# Patient Record
Sex: MALE | Race: WHITE | NOT HISPANIC OR LATINO | Employment: OTHER | URBAN - METROPOLITAN AREA
[De-identification: names, ages, dates, MRNs, and addresses within clinical notes are randomized per-mention and may not be internally consistent; named-entity substitution may affect disease eponyms.]

---

## 2019-04-03 ENCOUNTER — OFFICE VISIT (OUTPATIENT)
Dept: FAMILY MEDICINE | Facility: CLINIC | Age: 64
End: 2019-04-03
Payer: MEDICARE

## 2019-04-03 VITALS
SYSTOLIC BLOOD PRESSURE: 150 MMHG | BODY MASS INDEX: 29.18 KG/M2 | DIASTOLIC BLOOD PRESSURE: 75 MMHG | WEIGHT: 197 LBS | HEART RATE: 68 BPM | OXYGEN SATURATION: 97 % | HEIGHT: 69 IN | TEMPERATURE: 98 F

## 2019-04-03 DIAGNOSIS — E78.00 PURE HYPERCHOLESTEROLEMIA: ICD-10-CM

## 2019-04-03 DIAGNOSIS — F51.01 PRIMARY INSOMNIA: Primary | ICD-10-CM

## 2019-04-03 DIAGNOSIS — I10 ESSENTIAL HYPERTENSION: ICD-10-CM

## 2019-04-03 DIAGNOSIS — M15.9 PRIMARY OSTEOARTHRITIS INVOLVING MULTIPLE JOINTS: ICD-10-CM

## 2019-04-03 PROCEDURE — 99214 OFFICE O/P EST MOD 30 MIN: CPT | Mod: S$GLB,,, | Performed by: FAMILY MEDICINE

## 2019-04-03 PROCEDURE — 99214 PR OFFICE/OUTPT VISIT, EST, LEVL IV, 30-39 MIN: ICD-10-PCS | Mod: S$GLB,,, | Performed by: FAMILY MEDICINE

## 2019-04-03 RX ORDER — PREDNISONE 20 MG/1
20 TABLET ORAL DAILY
Qty: 30 TABLET | Refills: 0 | Status: SHIPPED | OUTPATIENT
Start: 2019-04-03 | End: 2019-04-13

## 2019-04-03 RX ORDER — HYDROCODONE BITARTRATE AND ACETAMINOPHEN 10; 325 MG/1; MG/1
1 TABLET ORAL EVERY 6 HOURS PRN
Qty: 28 TABLET | Refills: 0 | Status: SHIPPED | OUTPATIENT
Start: 2019-04-03 | End: 2019-04-10

## 2019-04-03 RX ORDER — IBUPROFEN 800 MG/1
TABLET ORAL
COMMUNITY
End: 2019-12-02 | Stop reason: SDUPTHER

## 2019-04-03 RX ORDER — ZOLPIDEM TARTRATE 5 MG/1
5 TABLET ORAL NIGHTLY
Qty: 30 TABLET | Refills: 2 | Status: SHIPPED | OUTPATIENT
Start: 2019-04-03 | End: 2019-07-08 | Stop reason: SDUPTHER

## 2019-04-03 RX ORDER — LOSARTAN POTASSIUM 100 MG/1
TABLET ORAL
Refills: 3 | COMMUNITY
Start: 2019-03-23 | End: 2019-04-24 | Stop reason: SDUPTHER

## 2019-04-03 RX ORDER — PRAVASTATIN SODIUM 20 MG/1
TABLET ORAL
Refills: 5 | COMMUNITY
Start: 2019-03-11 | End: 2019-07-22 | Stop reason: SDUPTHER

## 2019-04-03 RX ORDER — FENOFIBRATE 160 MG/1
TABLET ORAL
COMMUNITY
End: 2020-05-19

## 2019-04-03 RX ORDER — TRAMADOL HYDROCHLORIDE 50 MG/1
TABLET ORAL
COMMUNITY
End: 2019-04-03 | Stop reason: ALTCHOICE

## 2019-04-03 RX ORDER — ZOLPIDEM TARTRATE 5 MG/1
TABLET ORAL
Refills: 2 | COMMUNITY
Start: 2019-03-11 | End: 2019-04-03 | Stop reason: SDUPTHER

## 2019-04-03 RX ORDER — DILTIAZEM HYDROCHLORIDE 240 MG/1
CAPSULE, COATED, EXTENDED RELEASE ORAL
COMMUNITY
End: 2019-04-03

## 2019-04-03 RX ORDER — DILTIAZEM HYDROCHLORIDE 360 MG/1
360 CAPSULE, EXTENDED RELEASE ORAL DAILY
Qty: 30 CAPSULE | Refills: 11 | Status: SHIPPED | OUTPATIENT
Start: 2019-04-03 | End: 2020-04-02

## 2019-04-04 NOTE — PROGRESS NOTES
Subjective:       Patient ID: Marcello Dahl is a 64 y.o. male.    Chief Complaint: Follow-up    65 yo male in for follow up.  He need medication refills for osteoarthritis and insomnia.  His blood pressure is still running high.  He gets headaches sometimes and his blood is usually a little high when they occur.  He has been helping his wife take car of their 8 year old great grandson.    Hypertension   This is a chronic problem. The current episode started more than 1 year ago. The problem has been waxing and waning since onset. The problem is uncontrolled. Pertinent negatives include no anxiety, blurred vision, chest pain, headaches, malaise/fatigue, neck pain, orthopnea, palpitations, peripheral edema, PND, shortness of breath or sweats. Agents associated with hypertension include steroids and NSAIDs. Past treatments include calcium channel blockers and angiotensin blockers. Compliance problems include exercise.    Arthritis   Presents for follow-up visit. He complains of pain and stiffness. The symptoms have been stable. Affected locations include the right shoulder, left shoulder, left knee and right knee. His pain is at a severity of 7/10. Associated symptoms include pain at night. Pertinent negatives include no diarrhea, dry eyes, dry mouth, dysuria, fatigue, fever, pain while resting, rash, Raynaud's syndrome, uveitis or weight loss. Compliance with total regimen is %. Compliance with medications is %.   Hyperlipidemia   This is a chronic problem. The current episode started more than 1 year ago. The problem is controlled. Factors aggravating his hyperlipidemia include fatty foods. Pertinent negatives include no chest pain, leg pain, myalgias or shortness of breath. Current antihyperlipidemic treatment includes statins. The current treatment provides moderate improvement of lipids. Compliance problems include adherence to exercise.  Risk factors for coronary artery disease include dyslipidemia,  hypertension and male sex.     Review of Systems   Constitutional: Negative for fatigue, fever, malaise/fatigue and weight loss.   Eyes: Negative for blurred vision.   Respiratory: Negative for shortness of breath.    Cardiovascular: Negative for chest pain, palpitations, orthopnea and PND.   Gastrointestinal: Negative for diarrhea.   Genitourinary: Negative for dysuria.   Musculoskeletal: Positive for arthritis and stiffness. Negative for myalgias and neck pain.   Skin: Negative for rash.   Neurological: Negative for headaches.       Objective:      Physical Exam   Constitutional: He is oriented to person, place, and time. He appears well-developed and well-nourished.   HENT:   Head: Normocephalic and atraumatic.   Eyes: Pupils are equal, round, and reactive to light. Conjunctivae and EOM are normal.   Neck: Normal range of motion. Neck supple.   Cardiovascular: Normal rate, regular rhythm and normal heart sounds.   Pulmonary/Chest: Breath sounds normal. He has no wheezes. He has no rales.   Abdominal: Soft. Bowel sounds are normal. He exhibits no distension and no mass. There is no tenderness. There is no guarding.   Musculoskeletal: He exhibits no edema.        Right shoulder: He exhibits tenderness and pain.        Left shoulder: He exhibits tenderness and pain.        Right knee: Tenderness found.        Left knee: He exhibits decreased range of motion. Tenderness found.   Neurological: He is alert and oriented to person, place, and time. No cranial nerve deficit.   Skin: Skin is warm and dry. No rash noted. No erythema.   Psychiatric: He has a normal mood and affect. His behavior is normal.       Assessment:       1. Primary insomnia    2. Essential hypertension    3. Primary osteoarthritis involving multiple joints    4. Pure hypercholesterolemia        Plan:     Patient will be given a refill of zolpidem, hydrocodone 10/325 mg #28 with no refill and prednisone take as directed.  He will stop the Cardizem 240  mg and start the Cardiazem 360 mg for improved blood pressure control. He only gets the hydrocodone for pain when it is flared up.  He will follow up in 3 months with labs 1 week prior.

## 2019-04-24 RX ORDER — LOSARTAN POTASSIUM 100 MG/1
TABLET ORAL
Qty: 90 TABLET | Refills: 3 | Status: SHIPPED | OUTPATIENT
Start: 2019-04-24 | End: 2020-02-28 | Stop reason: SDUPTHER

## 2019-07-02 ENCOUNTER — TELEPHONE (OUTPATIENT)
Dept: FAMILY MEDICINE | Facility: CLINIC | Age: 64
End: 2019-07-02

## 2019-07-02 DIAGNOSIS — I10 ESSENTIAL HYPERTENSION: Primary | ICD-10-CM

## 2019-07-02 DIAGNOSIS — Z12.5 PROSTATE CANCER SCREENING: ICD-10-CM

## 2019-07-02 LAB
ABS NRBC COUNT: 0 X 10 3/UL (ref 0–0.01)
ABSOLUTE BASOPHIL: 0.03 X 10 3/UL (ref 0–0.22)
ABSOLUTE EOSINOPHIL: 0.02 X 10 3/UL (ref 0.04–0.54)
ABSOLUTE IMMATURE GRAN: 0.1 X 10 3/UL (ref 0–0.04)
ABSOLUTE LYMPHOCYTE: 1.58 X 10 3/UL (ref 0.86–4.75)
ABSOLUTE MONOCYTE: 0.78 X 10 3/UL (ref 0.22–1.08)
ALBUMIN SERPL-MCNC: 5.1 G/DL (ref 3.5–5.2)
ALBUMIN/GLOB SERPL ELPH: 2 {RATIO} (ref 1–2.7)
ALP ISOS SERPL LEV INH-CCNC: 94 IU/L (ref 40–130)
ALT (SGPT): 16 U/L (ref 0–41)
ANION GAP SERPL CALC-SCNC: 10 MMOL/L (ref 8–17)
AST SERPL-CCNC: 15 U/L (ref 0–40)
BASOPHILS NFR BLD: 0.3 %
BILIRUBIN, TOTAL: 0.84 MG/DL (ref 0–1.2)
BUN/CREAT SERPL: 23.2 (ref 6–20)
CALCIUM SERPL-MCNC: 9.7 MG/DL (ref 8.6–10.2)
CARBON DIOXIDE, CO2: 28 MMOL/L (ref 22–29)
CHLORIDE: 103 MMOL/L (ref 98–107)
CHOLEST SERPL-MSCNC: 179 MG/DL (ref 100–200)
CREAT SERPL-MCNC: 1.07 MG/DL (ref 0.7–1.2)
EOSINOPHIL NFR BLD: 0.2 %
GFR ESTIMATION: 69.15
GLOBULIN: 2.6 G/DL (ref 1.5–4.5)
GLUCOSE: 110 MG/DL (ref 82–115)
HCT VFR BLD AUTO: 44.4 % (ref 42–52)
HDLC SERPL-MCNC: 58 MG/DL
HGB BLD-MCNC: 15 G/DL (ref 14–18)
IMMATURE GRANULOCYTES: 0.9 % (ref 0–0.5)
LDL/HDL RATIO: 1.4 (ref 1–3)
LDLC SERPL CALC-MCNC: 79.8 MG/DL (ref 0–100)
LYMPHOCYTES NFR BLD: 13.7 %
MCH RBC QN AUTO: 33 PG (ref 27–32)
MCHC RBC AUTO-ENTMCNC: 33.8 G/DL (ref 32–36)
MCV RBC AUTO: 97.6 FL (ref 80–94)
MONOCYTES NFR BLD: 6.8 %
NEUTROPHILS ABSOLUTE COUNT: 9.02 X 10 3/UL (ref 2.15–7.56)
NEUTROPHILS NFR BLD: 78.1 %
NUCLEATED RED BLOOD CELLS: 0 /100 WBC (ref 0–0.2)
PLATELET # BLD AUTO: 263 X 10 3/UL (ref 135–400)
POTASSIUM: 4.6 MMOL/L (ref 3.5–5.1)
PROT SNV-MCNC: 7.7 G/DL (ref 6.4–8.3)
PSA: 1.97 NG/ML (ref 0–4)
RBC # BLD AUTO: 4.55 X 10 6/UL (ref 4.7–6.1)
RDW-SD: 42.6 FL (ref 37–54)
SODIUM: 141 MMOL/L (ref 136–145)
TRIGL SERPL-MCNC: 206 MG/DL (ref 0–150)
TSH SERPL DL<=0.005 MIU/L-ACNC: 1.71 UIU/ML (ref 0.27–4.2)
UREA NITROGEN (BUN): 24.8 MG/DL (ref 8–23)
WBC # BLD: 11.53 X 10 3/UL (ref 4.3–10.8)

## 2019-07-08 DIAGNOSIS — F51.01 PRIMARY INSOMNIA: ICD-10-CM

## 2019-07-08 RX ORDER — ZOLPIDEM TARTRATE 5 MG/1
TABLET ORAL
Qty: 30 TABLET | Refills: 2 | Status: SHIPPED | OUTPATIENT
Start: 2019-07-08 | End: 2019-07-10 | Stop reason: DRUGHIGH

## 2019-07-10 ENCOUNTER — OFFICE VISIT (OUTPATIENT)
Dept: FAMILY MEDICINE | Facility: CLINIC | Age: 64
End: 2019-07-10
Payer: MEDICARE

## 2019-07-10 VITALS
BODY MASS INDEX: 29.1 KG/M2 | SYSTOLIC BLOOD PRESSURE: 150 MMHG | DIASTOLIC BLOOD PRESSURE: 83 MMHG | HEIGHT: 69 IN | HEART RATE: 64 BPM | OXYGEN SATURATION: 99 % | WEIGHT: 196.5 LBS | TEMPERATURE: 98 F

## 2019-07-10 DIAGNOSIS — E78.00 PURE HYPERCHOLESTEROLEMIA: ICD-10-CM

## 2019-07-10 DIAGNOSIS — M15.9 PRIMARY OSTEOARTHRITIS INVOLVING MULTIPLE JOINTS: ICD-10-CM

## 2019-07-10 DIAGNOSIS — F51.01 PRIMARY INSOMNIA: ICD-10-CM

## 2019-07-10 DIAGNOSIS — N20.0 KIDNEY STONE: ICD-10-CM

## 2019-07-10 DIAGNOSIS — N32.81 OVERACTIVE BLADDER: ICD-10-CM

## 2019-07-10 DIAGNOSIS — I10 ESSENTIAL HYPERTENSION: ICD-10-CM

## 2019-07-10 PROCEDURE — 99214 PR OFFICE/OUTPT VISIT, EST, LEVL IV, 30-39 MIN: ICD-10-PCS | Mod: S$GLB,,, | Performed by: FAMILY MEDICINE

## 2019-07-10 PROCEDURE — 99214 OFFICE O/P EST MOD 30 MIN: CPT | Mod: S$GLB,,, | Performed by: FAMILY MEDICINE

## 2019-07-10 RX ORDER — ZOLPIDEM TARTRATE 10 MG/1
10 TABLET ORAL NIGHTLY PRN
Qty: 30 TABLET | Refills: 2 | Status: SHIPPED | OUTPATIENT
Start: 2019-07-10 | End: 2019-10-14 | Stop reason: SDUPTHER

## 2019-07-10 NOTE — PROGRESS NOTES
Subjective:       Patient ID: Marcello Dahl is a 64 y.o. male.    Chief Complaint: No chief complaint on file.    64-year-old male with history of hypertension, hyperlipidemia, osteoarthritis, and insomnia.  Patient states that he had an episode of severe low back pain and went to the emergency room and was diagnosed with a kidney stone.  He has had trouble with frequent urination since that time.  He states he is having to get up multiple times throughout the night to urinate and is interfering with his sleep.  He states his current sleeping pill is not working.  He states that he stayed on the 240 mg of Cardizem because the the medication that I prescribed was too expensive.  He reports that he has been checking his blood pressure at home and has been controlled most of the time.  He states he want to stay on his current dose of Cardizem.    Review of Systems   Constitutional: Negative for fever.   HENT: Negative for ear pain, postnasal drip, rhinorrhea, sinus pain and sore throat.    Eyes: Negative for redness.   Respiratory: Negative for cough, chest tightness, shortness of breath and wheezing.    Cardiovascular: Negative for chest pain, palpitations and leg swelling.   Gastrointestinal: Negative for constipation, diarrhea, nausea and vomiting.   Genitourinary: Positive for frequency. Negative for difficulty urinating and dysuria.   Musculoskeletal: Positive for arthralgias and back pain.   Skin: Negative for rash.   Neurological: Negative for dizziness.   Psychiatric/Behavioral: Positive for sleep disturbance.       Objective:      Physical Exam   Constitutional: He is oriented to person, place, and time. He appears well-developed and well-nourished.   HENT:   Head: Normocephalic and atraumatic.   Eyes: Pupils are equal, round, and reactive to light. Conjunctivae and EOM are normal.   Neck: Normal range of motion. Neck supple.   Cardiovascular: Normal rate, regular rhythm and normal heart sounds.    Pulmonary/Chest: Breath sounds normal. He has no wheezes. He has no rales.   Abdominal: Soft. Bowel sounds are normal. He exhibits no distension and no mass. There is no tenderness. There is no guarding.   Musculoskeletal: Normal range of motion. He exhibits no edema or tenderness.   Neurological: He is alert and oriented to person, place, and time. No cranial nerve deficit.   Skin: Skin is warm and dry. No rash noted. No erythema.   Psychiatric: He has a normal mood and affect. His behavior is normal.   Nursing note and vitals reviewed.      Assessment:       1. Essential hypertension    2. Pure hypercholesterolemia    3. Primary insomnia    4. Primary osteoarthritis involving multiple joints    5. Kidney stone    6. Overactive bladder        Plan:     hypertension is chronic and controlled on Cardizem and losartan.  He will continue to monitor his blood pressure closely and continue those meds for now.  Hyperlipidemia is controlled on pravastatin and fenofibrate.  His triglyceride on recent labs was 206 which I have explained to him is to high so he would like to try improving his diet and exercising more.  I will increase the zolpidem to 10 mg to see if it would help the insomnia better.  Use to continue hydrocodone and ibuprofen when needed for the osteoarthritis.  Patient wishes to follow up with his urologist for the kidney stone and overactive bladder issues that he has been experiencing recently.

## 2019-07-22 RX ORDER — PRAVASTATIN SODIUM 20 MG/1
TABLET ORAL
Qty: 30 TABLET | Refills: 5 | Status: SHIPPED | OUTPATIENT
Start: 2019-07-22 | End: 2020-05-19

## 2019-07-22 RX ORDER — TRAMADOL HYDROCHLORIDE 50 MG/1
TABLET ORAL
Qty: 60 TABLET | Refills: 3 | Status: SHIPPED | OUTPATIENT
Start: 2019-07-22 | End: 2020-05-19

## 2019-09-30 DIAGNOSIS — J40 BRONCHITIS: Primary | ICD-10-CM

## 2019-09-30 DIAGNOSIS — J40 BRONCHITIS: ICD-10-CM

## 2019-09-30 RX ORDER — PREDNISONE 20 MG/1
20 TABLET ORAL DAILY
Qty: 10 TABLET | Refills: 0 | Status: SHIPPED | OUTPATIENT
Start: 2019-09-30 | End: 2019-10-10

## 2019-09-30 RX ORDER — MONTELUKAST SODIUM 10 MG/1
TABLET ORAL
Qty: 90 TABLET | Refills: 0 | Status: SHIPPED | OUTPATIENT
Start: 2019-09-30 | End: 2020-05-19

## 2019-09-30 RX ORDER — MONTELUKAST SODIUM 10 MG/1
10 TABLET ORAL NIGHTLY
Qty: 30 TABLET | Refills: 0 | Status: SHIPPED | OUTPATIENT
Start: 2019-09-30 | End: 2019-09-30 | Stop reason: SDUPTHER

## 2019-09-30 RX ORDER — AZITHROMYCIN 250 MG/1
TABLET, FILM COATED ORAL
Qty: 6 TABLET | Refills: 0 | Status: SHIPPED | OUTPATIENT
Start: 2019-09-30 | End: 2019-10-05

## 2019-10-10 ENCOUNTER — OFFICE VISIT (OUTPATIENT)
Dept: FAMILY MEDICINE | Facility: CLINIC | Age: 64
End: 2019-10-10
Payer: MEDICARE

## 2019-10-10 VITALS
HEART RATE: 65 BPM | WEIGHT: 200.13 LBS | DIASTOLIC BLOOD PRESSURE: 82 MMHG | BODY MASS INDEX: 29.64 KG/M2 | TEMPERATURE: 98 F | OXYGEN SATURATION: 97 % | HEIGHT: 69 IN | SYSTOLIC BLOOD PRESSURE: 149 MMHG

## 2019-10-10 DIAGNOSIS — M15.9 PRIMARY OSTEOARTHRITIS INVOLVING MULTIPLE JOINTS: ICD-10-CM

## 2019-10-10 DIAGNOSIS — S39.012A STRAIN OF LUMBAR REGION, INITIAL ENCOUNTER: ICD-10-CM

## 2019-10-10 DIAGNOSIS — E78.2 MIXED HYPERLIPIDEMIA: ICD-10-CM

## 2019-10-10 DIAGNOSIS — N32.81 OVERACTIVE BLADDER: ICD-10-CM

## 2019-10-10 DIAGNOSIS — I10 ESSENTIAL HYPERTENSION: ICD-10-CM

## 2019-10-10 DIAGNOSIS — R05.9 COUGH: ICD-10-CM

## 2019-10-10 DIAGNOSIS — F51.01 PRIMARY INSOMNIA: ICD-10-CM

## 2019-10-10 PROCEDURE — 99214 OFFICE O/P EST MOD 30 MIN: CPT | Mod: S$GLB,,, | Performed by: FAMILY MEDICINE

## 2019-10-10 PROCEDURE — 99214 PR OFFICE/OUTPT VISIT, EST, LEVL IV, 30-39 MIN: ICD-10-PCS | Mod: S$GLB,,, | Performed by: FAMILY MEDICINE

## 2019-10-10 RX ORDER — HYDROCODONE BITARTRATE AND ACETAMINOPHEN 7.5; 325 MG/1; MG/1
1 TABLET ORAL EVERY 6 HOURS PRN
Qty: 28 TABLET | Refills: 0 | Status: SHIPPED | OUTPATIENT
Start: 2019-10-10 | End: 2020-03-25 | Stop reason: SDUPTHER

## 2019-10-10 RX ORDER — DILTIAZEM HYDROCHLORIDE 240 MG/1
CAPSULE, EXTENDED RELEASE ORAL
Refills: 5 | COMMUNITY
Start: 2019-08-30 | End: 2020-04-07

## 2019-10-10 RX ORDER — BENZONATATE 200 MG/1
200 CAPSULE ORAL 3 TIMES DAILY PRN
Qty: 30 CAPSULE | Refills: 2 | Status: SHIPPED | OUTPATIENT
Start: 2019-10-10 | End: 2019-10-20

## 2019-10-10 RX ORDER — OXYBUTYNIN CHLORIDE 5 MG/1
TABLET, EXTENDED RELEASE ORAL
Refills: 2 | COMMUNITY
Start: 2019-09-12 | End: 2020-02-10

## 2019-10-10 RX ORDER — TIZANIDINE 2 MG/1
4 TABLET ORAL EVERY 8 HOURS PRN
Qty: 30 TABLET | Refills: 2 | Status: SHIPPED | OUTPATIENT
Start: 2019-10-10 | End: 2019-10-20

## 2019-10-10 NOTE — ASSESSMENT & PLAN NOTE
Hyperlipidemia is chronic and not the best control regarding the triglycerides.  He is on fenofibrate 160 and pravastatin 20 mg.  He has muscle cramps when he takes the higher dosage of statin.

## 2019-10-10 NOTE — ASSESSMENT & PLAN NOTE
Hypertension is chronic and under fair control and patient was told to continue diltiazem and losartan as directed in addition to low-sodium diet and exercise.

## 2019-10-10 NOTE — ASSESSMENT & PLAN NOTE
Chronic pain due to osteoarthritis and he is controlled with ibuprofen and narcotic pain medication as needed.

## 2019-10-10 NOTE — PROGRESS NOTES
Subjective:      Patient ID: Marcello Dahl is a 64 y.o. male.    Chief Complaint: Follow-up; Hypertension; and Cough    64-year-old male with history of hypertension, hyperlipidemia, osteoarthritis, and insomnia.  His blood pressure and cholesterol was not the best control at last visit and he was told to comply with taking meds as prescribed as well as trying to watch his diet and get more exercise.  He also supposed to see his urologist for kidney stones since the last visit.  He has arthritis of multiple joints and takes chronic anti-inflammatory and narcotic pain medication as needed for it.  He did have symptoms of an upper respiratory infection a couple weeks ago and was sent out antibiotics, steroids and something for cough to his pharmacy by me. He is still with a cough.  The urologist started him on oxybutynin 5 mg XL for for overactive bladder.    Hypertension: Patient here for follow-up of elevated blood pressure. He is not exercising and is adherent to low salt diet.  Blood pressure is well controlled at home. Cardiac symptoms none. Patient denies chest pain, dyspnea and palpitations.  Cardiovascular risk factors: advanced age (older than 55 for men, 65 for women), dyslipidemia, hypertension and male gender. Use of agents associated with hypertension: none. History of target organ damage: angina/ prior myocardial infarction    He is also here for follow up of dyslipidemia. The patient does use medications that may worsen dyslipidemias (corticosteroids, progestins, anabolic steroids, diuretics, beta-blockers, amiodarone, cyclosporine, olanzapine). The patient exercises rarely.  The patient is known to have coexisting coronary artery disease.           Back Pain   This is a new problem. The current episode started 1 to 4 weeks ago. The problem occurs intermittently. The pain is present in the costovertebral angle. The quality of the pain is described as aching. The pain is at a severity of 8/10. The pain  is severe. The symptoms are aggravated by bending, lying down, sitting, standing and twisting. Pertinent negatives include no abdominal pain, bladder incontinence, bowel incontinence, dysuria, fever, leg pain or numbness. Risk factors include lack of exercise, poor posture, renal stones and sedentary lifestyle. He has tried NSAIDs and analgesics for the symptoms. The treatment provided moderate relief.   Cough   This is a new problem. The current episode started 1 to 4 weeks ago. The problem has been waxing and waning. The problem occurs every few minutes. The cough is non-productive. Associated symptoms include myalgias, postnasal drip and rhinorrhea. Pertinent negatives include no chills, ear pain, eye redness, fever, rash, sore throat, shortness of breath or wheezing. Nothing aggravates the symptoms. Risk factors for lung disease include animal exposure. He has tried oral steroids for the symptoms. The treatment provided moderate relief. His past medical history is significant for bronchitis.     Review of Systems   Constitutional: Negative for chills and fever.   HENT: Positive for postnasal drip, rhinorrhea and sinus pain. Negative for ear pain and sore throat.    Eyes: Negative for redness.   Respiratory: Positive for cough. Negative for chest tightness, shortness of breath and wheezing.    Cardiovascular: Negative for palpitations and leg swelling.   Gastrointestinal: Negative for abdominal pain, bowel incontinence, constipation, diarrhea, nausea and vomiting.   Genitourinary: Positive for frequency. Negative for bladder incontinence, difficulty urinating and dysuria.   Musculoskeletal: Positive for arthralgias, back pain and myalgias.   Skin: Negative for rash.   Neurological: Negative for dizziness and numbness.   Psychiatric/Behavioral: Positive for sleep disturbance.     Medication List with Changes/Refills   Current Medications    DILTIAZEM (CARDIZEM CD) 360 MG 24 HR CAPSULE    Take 1 capsule (360 mg  "total) by mouth once daily.    DILTIAZEM (TIAZAC) 240 MG CS24    TK 1 C PO QD    FENOFIBRATE 160 MG TAB    fenofibrate 160 mg tablet   Take 1 tablet every day by oral route.    IBUPROFEN (ADVIL,MOTRIN) 800 MG TABLET    ibuprofen 800 mg tablet   TK 1 T PO BID    LOSARTAN (COZAAR) 100 MG TABLET    TK 1 T PO QD    MONTELUKAST (SINGULAIR) 10 MG TABLET    TAKE 1 TABLET BY MOUTH EVERY EVENING    OXYBUTYNIN (DITROPAN-XL) 5 MG TR24    TK 1 T PO QD    PRAVASTATIN (PRAVACHOL) 20 MG TABLET    TAKE 1 TABLET BY MOUTH EVERY DAY    PREDNISONE (DELTASONE) 20 MG TABLET    Take 1 tablet (20 mg total) by mouth once daily. for 10 days    TRAMADOL (ULTRAM) 50 MG TABLET    TAKE 1 TABLET BY MOUTH TWICE DAILY    ZOLPIDEM (AMBIEN) 10 MG TAB    Take 1 tablet (10 mg total) by mouth nightly as needed.      Objective:   BP (!) 149/82 (BP Location: Left arm, Patient Position: Sitting, BP Method: Medium (Automatic))   Pulse 65   Temp 98.2 °F (36.8 °C)   Ht 5' 9" (1.753 m)   Wt 90.8 kg (200 lb 2 oz)   SpO2 97%   BMI 29.55 kg/m²    Estimated body mass index is 29.55 kg/m² as calculated from the following:    Height as of this encounter: 5' 9" (1.753 m).    Weight as of this encounter: 90.8 kg (200 lb 2 oz).   Physical Exam   Constitutional: He is oriented to person, place, and time. Vital signs are normal. He appears well-developed and well-nourished.   HENT:   Head: Normocephalic and atraumatic.   Right Ear: Hearing and tympanic membrane normal.   Left Ear: Hearing and tympanic membrane normal.   Nose: Nose normal.   Mouth/Throat: Posterior oropharyngeal edema and posterior oropharyngeal erythema present.   Eyes: Pupils are equal, round, and reactive to light. Conjunctivae and EOM are normal.   Neck: Normal range of motion. Neck supple.   Cardiovascular: Normal rate, regular rhythm and normal heart sounds.   Pulmonary/Chest: Breath sounds normal. He has no wheezes. He has no rales.   Abdominal: Soft. Bowel sounds are normal. He exhibits no " distension and no mass. There is no tenderness. There is no guarding.   Musculoskeletal: Normal range of motion. He exhibits no edema.        Lumbar back: He exhibits tenderness and spasm.        Back:    Tenderness to palpation of the right CVA.   Neurological: He is alert and oriented to person, place, and time. No cranial nerve deficit.   Skin: Skin is warm and dry. No rash noted. No erythema.   Psychiatric: He has a normal mood and affect. His speech is normal and behavior is normal. Thought content normal. Cognition and memory are normal.   Nursing note and vitals reviewed.    Lab Results   Component Value Date    WBC 11.53 (H) 07/02/2019    HGB 15.0 07/02/2019    HCT 44.4 07/02/2019     07/02/2019    CHOL 179 07/02/2019    TRIG 206 (H) 07/02/2019    HDL 58 (L) 07/02/2019    AST 15 07/02/2019     07/02/2019    K 4.6 07/02/2019     07/02/2019    CREATININE 1.07 07/02/2019    BUN 24.8 (H) 07/02/2019    CO2 28 07/02/2019    TSH 1.71 07/02/2019      Assessment:      Problem List Items Addressed This Visit        Pulmonary    Cough    Current Assessment & Plan     Tessalon perles 200 mg po tid for cough.         Relevant Medications    benzonatate (TESSALON) 200 MG capsule       Cardiac/Vascular    Essential hypertension    Current Assessment & Plan       Hypertension is chronic and under fair control and patient was told to continue diltiazem and losartan as directed in addition to low-sodium diet and exercise.         Relevant Orders    Comprehensive metabolic panel    CBC auto differential    Lipid panel    TSH    Mixed hyperlipidemia    Current Assessment & Plan       Hyperlipidemia is chronic and not the best control regarding the triglycerides.  He is on fenofibrate 160 and pravastatin 20 mg.  He has muscle cramps when he takes the higher dosage of statin.            Renal/    Overactive bladder    Current Assessment & Plan       Continue the oxybutynin XL 5 mg as prescribed by urology.             Orthopedic    Primary osteoarthritis involving multiple joints    Current Assessment & Plan       Chronic pain due to osteoarthritis and he is controlled with ibuprofen and narcotic pain medication as needed.         Strain of lumbar region    Current Assessment & Plan       Prescribe tizanidine and hydrocodone for acute lumbar strain.         Relevant Medications    HYDROcodone-acetaminophen (NORCO) 7.5-325 mg per tablet    tiZANidine (ZANAFLEX) 2 MG tablet       Other    Primary insomnia    Current Assessment & Plan       Insomnia controlled on zolpidem 10 mg at bedtime.                Plan:      Patient to follow up in 3 months with fasting labs 1 week prior.

## 2019-10-14 DIAGNOSIS — F51.01 PRIMARY INSOMNIA: ICD-10-CM

## 2019-10-14 RX ORDER — ZOLPIDEM TARTRATE 10 MG/1
TABLET ORAL
Qty: 30 TABLET | Refills: 2 | Status: SHIPPED | OUTPATIENT
Start: 2019-10-14 | End: 2020-01-06 | Stop reason: SDUPTHER

## 2019-12-02 RX ORDER — IBUPROFEN 800 MG/1
TABLET ORAL
Qty: 60 TABLET | Refills: 5 | Status: SHIPPED | OUTPATIENT
Start: 2019-12-02 | End: 2020-01-10 | Stop reason: SDUPTHER

## 2019-12-02 NOTE — TELEPHONE ENCOUNTER
----- Message from Antoinette Casper sent at 12/2/2019  9:09 AM CST -----  Contact: patient  Needs a refill on Ibuprofen 800 mg  Pharmacy [facility] in [place]

## 2019-12-30 ENCOUNTER — OFFICE VISIT (OUTPATIENT)
Dept: UROLOGY | Facility: CLINIC | Age: 64
End: 2019-12-30
Payer: MEDICARE

## 2019-12-30 VITALS — SYSTOLIC BLOOD PRESSURE: 148 MMHG | DIASTOLIC BLOOD PRESSURE: 80 MMHG | RESPIRATION RATE: 16 BRPM | HEART RATE: 70 BPM

## 2019-12-30 DIAGNOSIS — N32.81 OVERACTIVE BLADDER: Primary | ICD-10-CM

## 2019-12-30 DIAGNOSIS — N13.8 BPH WITH URINARY OBSTRUCTION: ICD-10-CM

## 2019-12-30 DIAGNOSIS — N40.1 BPH WITH URINARY OBSTRUCTION: ICD-10-CM

## 2019-12-30 LAB
BILIRUB UR QL STRIP: NEGATIVE
GLUCOSE UR QL STRIP: NEGATIVE
KETONES UR QL STRIP: NEGATIVE
LEUKOCYTE ESTERASE UR QL STRIP: NEGATIVE
Lab: 12 ML
PH, POC UA: 5.5
POC AMORP, URINE: NORMAL
POC BACTI, URINE: NORMAL
POC BLOOD, URINE: NEGATIVE
POC CASTS, URINE: NORMAL
POC CRYST, URINE: NORMAL
POC EPITH, URINE: NORMAL
POC HCG, URINE: NORMAL
POC HYALIN, URINE: 0 LPF
POC MUCUS, URINE: NORMAL
POC NITRATES, URINE: NEGATIVE
POC OTHER, URINE: NORMAL
POC RBC, URINE: 0 HPF
POC WBC, URINE: 0 HPF
PROT UR QL STRIP: NEGATIVE
PSA, DIAGNOSTIC: 2.19 NG/ML (ref 0.1–4)
SP GR UR STRIP: 1.02 (ref 1–1.03)
UROBILINOGEN UR STRIP-ACNC: 1 (ref 0.3–2.2)

## 2019-12-30 PROCEDURE — 1159F MED LIST DOCD IN RCRD: CPT | Mod: S$GLB,,, | Performed by: SPECIALIST

## 2019-12-30 PROCEDURE — 99213 OFFICE O/P EST LOW 20 MIN: CPT | Mod: S$GLB,,, | Performed by: SPECIALIST

## 2019-12-30 PROCEDURE — 99213 PR OFFICE/OUTPT VISIT, EST, LEVL III, 20-29 MIN: ICD-10-PCS | Mod: S$GLB,,, | Performed by: SPECIALIST

## 2019-12-30 PROCEDURE — 1126F AMNT PAIN NOTED NONE PRSNT: CPT | Mod: S$GLB,,, | Performed by: SPECIALIST

## 2019-12-30 PROCEDURE — 1159F PR MEDICATION LIST DOCUMENTED IN MEDICAL RECORD: ICD-10-PCS | Mod: S$GLB,,, | Performed by: SPECIALIST

## 2019-12-30 PROCEDURE — 1126F PR PAIN SEVERITY QUANTIFIED, NO PAIN PRESENT: ICD-10-PCS | Mod: S$GLB,,, | Performed by: SPECIALIST

## 2019-12-30 RX ORDER — OXYBUTYNIN CHLORIDE 10 MG/1
10 TABLET, EXTENDED RELEASE ORAL DAILY
Qty: 30 TABLET | Refills: 11 | Status: SHIPPED | OUTPATIENT
Start: 2019-12-30 | End: 2020-02-10 | Stop reason: SDUPTHER

## 2019-12-30 NOTE — PROGRESS NOTES
Subjective:       Patient ID: Marcello Dahl is a 64 y.o. male.    Chief Complaint: Follow-up (3 month follow up. Patient reports continious frequencey and nocturia x 6.)      HPI:  64-year-old patient of mine who has persistent lower urinary tract symptoms which were managing.  He is here for 3 month follow-up.    His symptoms predominantly include frequency during the day nocturia as well as urgency is he denies any urge incontinence.  He denies voiding symptoms because his stream is strong he empties his bladder very well without any hesitancy or intermittency.    There was a time he was treated briefly with oxybutynin 5 mg extended-release tablets 1 tablet a day and that seemed to have helped him.  But after several weeks the symptoms returned to baseline.    He had a history of elevated PSA and abnormal digital rectal exam and he underwent a needle biopsy of the prostate in late 2016 which was negative.    Lifestyle choices include one can of soda every 2 days, one cup of coffee in the morning.  He does not drink any tea.  Denies any constipation.    Past Medical History:   Past Medical History:   Diagnosis Date    Enlarged prostate     Hyperlipemia     Hypertension     Hypertriglyceridemia     Insomnia     Vitamin D deficiency        Past Surgical Historical:   Past Surgical History:   Procedure Laterality Date    HERNIA REPAIR      SHOULDER SURGERY          Medications:   Medication List with Changes/Refills   Current Medications    DILTIAZEM (CARDIZEM CD) 360 MG 24 HR CAPSULE    Take 1 capsule (360 mg total) by mouth once daily.    DILTIAZEM (TIAZAC) 240 MG CS24    TK 1 C PO QD    FENOFIBRATE 160 MG TAB    fenofibrate 160 mg tablet   Take 1 tablet every day by oral route.    HYDROCODONE-ACETAMINOPHEN (NORCO) 7.5-325 MG PER TABLET    Take 1 tablet by mouth every 6 (six) hours as needed for Pain.    IBUPROFEN (ADVIL,MOTRIN) 800 MG TABLET    TK 1 T PO BID    LOSARTAN (COZAAR) 100 MG TABLET    TK 1 T PO  QD    MONTELUKAST (SINGULAIR) 10 MG TABLET    TAKE 1 TABLET BY MOUTH EVERY EVENING    OXYBUTYNIN (DITROPAN-XL) 5 MG TR24    TK 1 T PO QD    PRAVASTATIN (PRAVACHOL) 20 MG TABLET    TAKE 1 TABLET BY MOUTH EVERY DAY    TRAMADOL (ULTRAM) 50 MG TABLET    TAKE 1 TABLET BY MOUTH TWICE DAILY    ZOLPIDEM (AMBIEN) 10 MG TAB    TAKE 1 TABLET BY MOUTH NIGHTLY AS NEEDED        Past Social History:   Social History     Socioeconomic History    Marital status:      Spouse name: Not on file    Number of children: Not on file    Years of education: Not on file    Highest education level: Not on file   Occupational History    Not on file   Social Needs    Financial resource strain: Not on file    Food insecurity:     Worry: Not on file     Inability: Not on file    Transportation needs:     Medical: Not on file     Non-medical: Not on file   Tobacco Use    Smoking status: Former Smoker   Substance and Sexual Activity    Alcohol use: Never     Frequency: Never    Drug use: Never    Sexual activity: Not on file   Lifestyle    Physical activity:     Days per week: Not on file     Minutes per session: Not on file    Stress: Not on file   Relationships    Social connections:     Talks on phone: Not on file     Gets together: Not on file     Attends Jain service: Not on file     Active member of club or organization: Not on file     Attends meetings of clubs or organizations: Not on file     Relationship status:    Other Topics Concern    Not on file   Social History Narrative    Not on file       Allergies: Review of patient's allergies indicates:  No Known Allergies     Family History: History reviewed. No pertinent family history.     Review of Systems:   systems reviewed and notable for BPH and lower tract symptoms storage symptoms  All other systems were reviewed Neg except as stated in the HPI    Physical Exam:  AGeneral: A&Ox3. No apparent distress. No deformities.  Neck: No masses. Normal  thyroid.  Lungs: normal inspiration. No use of accessory muscles.  Heart: normal pulse. No arrhythmias.  Abdomen: Soft. NT. ND. No masses. No hernias. No hepatosplenomegaly.  Lymphatic: Neck and groin nodes negative.  Skin: The skin is warm and dry. No jaundice.  Neurology: Cranial nerves 2-12 crossly intact, no focal weaknesses, no sensation deficits, no motor deficits  Ext: No clubbing, cyanosis or edema.  : External genitalia normal. uncircumcised. No lesions. Meatus normal size and location. Urethra normal.  Scrotum is normal without any abnormalities No masses. Bladder normal. No fullness or masses. Anus/perineum normal. Normal Sphincter tone. Prostate is soft, surface smooth, size estimated at about 30 grams        Assessment/Plan:       64-year-old man with persistent bladder story symptoms (overactive bladder).    1.  I will resume his oxybutynin therapy but this time increase dose to 10 mg XL 1 tablet a day.  2.  Digital rectal exams been accomplished today obtain blood for serum PSA today give patient a call with the results  3.  If he does not respond to the increasing dose of oxybutynin then I will consider cystoscopy.  4.  Return to clinic in 6 months    Problem List Items Addressed This Visit        Renal/    Overactive bladder - Primary    Relevant Orders    POCT Urinalysis (w/Micro Option)    Bladder scan (Completed)      Other Visit Diagnoses     BPH with urinary obstruction

## 2020-01-02 ENCOUNTER — TELEPHONE (OUTPATIENT)
Dept: UROLOGY | Facility: CLINIC | Age: 65
End: 2020-01-02

## 2020-01-06 ENCOUNTER — TELEPHONE (OUTPATIENT)
Dept: UROLOGY | Facility: CLINIC | Age: 65
End: 2020-01-06

## 2020-01-06 DIAGNOSIS — F51.01 PRIMARY INSOMNIA: ICD-10-CM

## 2020-01-06 LAB
ABS NRBC COUNT: 0 X 10 3/UL (ref 0–0.01)
ABSOLUTE BASOPHIL: 0.05 X 10 3/UL (ref 0–0.22)
ABSOLUTE EOSINOPHIL: 0.16 X 10 3/UL (ref 0.04–0.54)
ABSOLUTE IMMATURE GRAN: 0.01 X 10 3/UL (ref 0–0.04)
ABSOLUTE LYMPHOCYTE: 1.1 X 10 3/UL (ref 0.86–4.75)
ABSOLUTE MONOCYTE: 0.32 X 10 3/UL (ref 0.22–1.08)
ALBUMIN SERPL-MCNC: 4.8 G/DL (ref 3.5–5.2)
ALBUMIN/GLOB SERPL ELPH: 1.7 {RATIO} (ref 1–2.7)
ALP ISOS SERPL LEV INH-CCNC: 113 U/L (ref 40–130)
ALT (SGPT): 21 U/L (ref 0–41)
ANION GAP SERPL CALC-SCNC: 9 MMOL/L (ref 8–17)
AST SERPL-CCNC: 18 U/L (ref 0–40)
BASOPHILS NFR BLD: 1 % (ref 0.2–1.2)
BILIRUBIN, TOTAL: 0.5 MG/DL (ref 0–1.2)
BUN/CREAT SERPL: 26.5 (ref 6–20)
CALCIUM SERPL-MCNC: 9.4 MG/DL (ref 8.6–10.2)
CARBON DIOXIDE, CO2: 25 MMOL/L (ref 22–29)
CHLORIDE: 106 MMOL/L (ref 98–107)
CHOLEST SERPL-MSCNC: 210 MG/DL (ref 100–200)
CREAT SERPL-MCNC: 1.1 MG/DL (ref 0.7–1.2)
EOSINOPHIL NFR BLD: 3.1 % (ref 0.7–7)
GFR ESTIMATION: 66.97
GLOBULIN: 2.8 G/DL (ref 1.5–4.5)
GLUCOSE: 113 MG/DL (ref 82–115)
HCT VFR BLD AUTO: 42.9 % (ref 42–52)
HDLC SERPL-MCNC: 42 MG/DL
HGB BLD-MCNC: 14.9 G/DL (ref 14–18)
IMMATURE GRANULOCYTES: 0.2 % (ref 0–0.5)
LDL/HDL RATIO: 3.1 (ref 1–3)
LDLC SERPL CALC-MCNC: 129.6 MG/DL (ref 0–100)
LYMPHOCYTES NFR BLD: 21.1 % (ref 19.3–53.1)
MCH RBC QN AUTO: 32.5 PG (ref 27–32)
MCHC RBC AUTO-ENTMCNC: 34.7 G/DL (ref 32–36)
MCV RBC AUTO: 93.7 FL (ref 80–94)
MONOCYTES NFR BLD: 6.1 % (ref 4.7–12.5)
NEUTROPHILS ABSOLUTE COUNT: 3.57 X 10 3/UL (ref 2.15–7.56)
NEUTROPHILS NFR BLD: 68.5 % (ref 34–71.1)
NUCLEATED RED BLOOD CELLS: 0 /100 WBC (ref 0–0.2)
PLATELET # BLD AUTO: 207 X 10 3/UL (ref 135–400)
POTASSIUM: 4.5 MMOL/L (ref 3.5–5.1)
PROT SNV-MCNC: 7.6 G/DL (ref 6.4–8.3)
RBC # BLD AUTO: 4.58 X 10 6/UL (ref 4.7–6.1)
RDW-SD: 39.1 FL (ref 37–54)
SODIUM: 140 MMOL/L (ref 136–145)
TRIGL SERPL-MCNC: 192 MG/DL (ref 0–150)
TSH SERPL DL<=0.005 MIU/L-ACNC: 3.54 UIU/ML (ref 0.27–4.2)
UREA NITROGEN (BUN): 29.1 MG/DL (ref 8–23)
WBC # BLD: 5.21 X 10 3/UL (ref 4.3–10.8)

## 2020-01-06 RX ORDER — ZOLPIDEM TARTRATE 10 MG/1
TABLET ORAL
Qty: 30 TABLET | Refills: 2 | Status: SHIPPED | OUTPATIENT
Start: 2020-01-06 | End: 2020-04-07 | Stop reason: SDUPTHER

## 2020-01-06 NOTE — TELEPHONE ENCOUNTER
Contacted pt regarding PSA results being stable and VIKTORIYA wants to continue monitoring. PT verbalized understanding. NB    ----- Message from Matt Hidalgo MD sent at 1/4/2020  2:14 PM CST -----  You could try 1 more time to call him with the results his PSA is stable we will continue observation.

## 2020-01-06 NOTE — TELEPHONE ENCOUNTER
----- Message from Julianne Sparks sent at 1/6/2020  9:20 AM CST -----  Pt ask if he can get a few sleeping pills called out until his appt Friday he is completely out its the generic for ambien... [facility] pharmacy in [facility].

## 2020-01-08 ENCOUNTER — TELEPHONE (OUTPATIENT)
Dept: UROLOGY | Facility: CLINIC | Age: 65
End: 2020-01-08

## 2020-01-10 ENCOUNTER — OFFICE VISIT (OUTPATIENT)
Dept: FAMILY MEDICINE | Facility: CLINIC | Age: 65
End: 2020-01-10
Payer: MEDICARE

## 2020-01-10 VITALS
BODY MASS INDEX: 28.75 KG/M2 | TEMPERATURE: 99 F | WEIGHT: 194.13 LBS | OXYGEN SATURATION: 97 % | DIASTOLIC BLOOD PRESSURE: 85 MMHG | HEART RATE: 87 BPM | HEIGHT: 69 IN | SYSTOLIC BLOOD PRESSURE: 139 MMHG

## 2020-01-10 DIAGNOSIS — J11.1 INFLUENZA: ICD-10-CM

## 2020-01-10 DIAGNOSIS — E78.2 MIXED HYPERLIPIDEMIA: ICD-10-CM

## 2020-01-10 DIAGNOSIS — F51.01 PRIMARY INSOMNIA: ICD-10-CM

## 2020-01-10 DIAGNOSIS — I10 ESSENTIAL HYPERTENSION: Primary | ICD-10-CM

## 2020-01-10 DIAGNOSIS — K21.9 GERD WITHOUT ESOPHAGITIS: ICD-10-CM

## 2020-01-10 DIAGNOSIS — R07.9 CHEST PAIN, UNSPECIFIED TYPE: ICD-10-CM

## 2020-01-10 PROCEDURE — 99214 PR OFFICE/OUTPT VISIT, EST, LEVL IV, 30-39 MIN: ICD-10-PCS | Mod: 25,S$GLB,, | Performed by: FAMILY MEDICINE

## 2020-01-10 PROCEDURE — 1159F PR MEDICATION LIST DOCUMENTED IN MEDICAL RECORD: ICD-10-PCS | Mod: S$GLB,,, | Performed by: FAMILY MEDICINE

## 2020-01-10 PROCEDURE — 1159F MED LIST DOCD IN RCRD: CPT | Mod: S$GLB,,, | Performed by: FAMILY MEDICINE

## 2020-01-10 PROCEDURE — 99214 OFFICE O/P EST MOD 30 MIN: CPT | Mod: 25,S$GLB,, | Performed by: FAMILY MEDICINE

## 2020-01-10 RX ORDER — BENZONATATE 200 MG/1
200 CAPSULE ORAL 3 TIMES DAILY PRN
COMMUNITY
Start: 2019-12-02 | End: 2020-05-19

## 2020-01-10 RX ORDER — PANTOPRAZOLE SODIUM 40 MG/1
40 TABLET, DELAYED RELEASE ORAL DAILY
Qty: 30 TABLET | Refills: 5 | Status: SHIPPED | OUTPATIENT
Start: 2020-01-10 | End: 2020-05-19

## 2020-01-10 RX ORDER — TIZANIDINE 2 MG/1
4 TABLET ORAL EVERY 8 HOURS PRN
Refills: 1 | COMMUNITY
Start: 2019-11-20 | End: 2020-02-19 | Stop reason: SDUPTHER

## 2020-01-10 RX ORDER — IBUPROFEN 800 MG/1
TABLET ORAL
Qty: 60 TABLET | Refills: 5 | Status: SHIPPED | OUTPATIENT
Start: 2020-01-10 | End: 2020-03-02 | Stop reason: SDUPTHER

## 2020-01-10 RX ORDER — TRAZODONE HYDROCHLORIDE 300 MG/1
300 TABLET ORAL NIGHTLY
Qty: 30 TABLET | Refills: 5 | Status: SHIPPED | OUTPATIENT
Start: 2020-01-10 | End: 2020-05-19

## 2020-01-10 RX ORDER — OSELTAMIVIR PHOSPHATE 75 MG/1
75 CAPSULE ORAL 2 TIMES DAILY
Qty: 10 CAPSULE | Refills: 0 | Status: SHIPPED | OUTPATIENT
Start: 2020-01-10 | End: 2020-01-15

## 2020-01-10 RX ORDER — DILTIAZEM HYDROCHLORIDE 240 MG/1
CAPSULE, EXTENDED RELEASE ORAL
COMMUNITY
Start: 2020-01-02 | End: 2020-04-07 | Stop reason: SDUPTHER

## 2020-01-13 PROBLEM — K21.9 GERD WITHOUT ESOPHAGITIS: Status: ACTIVE | Noted: 2020-01-13

## 2020-01-13 PROBLEM — R07.9 CHEST PAIN: Status: ACTIVE | Noted: 2020-01-13

## 2020-01-13 PROBLEM — J11.1 INFLUENZA: Status: ACTIVE | Noted: 2020-01-13

## 2020-01-13 NOTE — PROGRESS NOTES
Subjective:      Patient ID: Marcello Dahl is a 64 y.o. male.    Chief Complaint: Follow-up and Hypertension    64-year-old male in for follow-up patient states that he has been with symptoms of the flu.  He states his grandson had the flu.  He has been taking care of his grandson He has felt weak and feverish and achy and has a cough.  He states his symptoms has been ongoing on for 24 hr.  He would like to be treated for the flu in order to avoid past and on to his wife if possible.  He also states that the Ambien is no longer working for insomnia and he would like to try different medication for insomnia.  He also has been with a lot of chest pain that he describes as heartburn and medication tried over-the-counter is not helping.  He is he reports he does not have a history of coronary artery disease but he does have hypertension and hyperlipidemia and has not had his heart checked in a while.      Review of Systems   Constitutional: Positive for fatigue and fever.   HENT: Positive for sore throat. Negative for ear pain, postnasal drip, rhinorrhea and sinus pain.    Eyes: Negative for redness.   Respiratory: Positive for cough. Negative for chest tightness, shortness of breath and wheezing.    Cardiovascular: Negative for chest pain, palpitations and leg swelling.   Gastrointestinal: Negative for constipation, diarrhea, nausea and vomiting.        Heartburn that has been worsening   Genitourinary: Negative for difficulty urinating and dysuria.   Musculoskeletal: Negative for arthralgias.   Skin: Negative for rash.   Neurological: Positive for weakness. Negative for dizziness.     Medication List with Changes/Refills   New Medications    OSELTAMIVIR (TAMIFLU) 75 MG CAPSULE    Take 1 capsule (75 mg total) by mouth 2 (two) times daily. for 5 days    PANTOPRAZOLE (PROTONIX) 40 MG TABLET    Take 1 tablet (40 mg total) by mouth once daily.    TRAZODONE (DESYREL) 300 MG TABLET    Take 1 tablet (300 mg total) by mouth  "every evening.   Current Medications    BENZONATATE (TESSALON) 200 MG CAPSULE    Take 200 mg by mouth 3 (three) times daily as needed.    CARTIA  MG 24 HR CAPSULE        DILTIAZEM (CARDIZEM CD) 360 MG 24 HR CAPSULE    Take 1 capsule (360 mg total) by mouth once daily.    DILTIAZEM (TIAZAC) 240 MG CS24    TK 1 C PO QD    FENOFIBRATE 160 MG TAB    fenofibrate 160 mg tablet   Take 1 tablet every day by oral route.    HYDROCODONE-ACETAMINOPHEN (NORCO) 7.5-325 MG PER TABLET    Take 1 tablet by mouth every 6 (six) hours as needed for Pain.    LOSARTAN (COZAAR) 100 MG TABLET    TK 1 T PO QD    MONTELUKAST (SINGULAIR) 10 MG TABLET    TAKE 1 TABLET BY MOUTH EVERY EVENING    OXYBUTYNIN (DITROPAN-XL) 10 MG 24 HR TABLET    Take 1 tablet (10 mg total) by mouth once daily.    OXYBUTYNIN (DITROPAN-XL) 5 MG TR24    TK 1 T PO QD    PRAVASTATIN (PRAVACHOL) 20 MG TABLET    TAKE 1 TABLET BY MOUTH EVERY DAY    TIZANIDINE (ZANAFLEX) 2 MG TABLET    Take 4 mg by mouth every 8 (eight) hours as needed.    TRAMADOL (ULTRAM) 50 MG TABLET    TAKE 1 TABLET BY MOUTH TWICE DAILY    ZOLPIDEM (AMBIEN) 10 MG TAB    TAKE 1 TABLET BY MOUTH NIGHTLY AS NEEDED   Changed and/or Refilled Medications    Modified Medication Previous Medication    IBUPROFEN (ADVIL,MOTRIN) 800 MG TABLET ibuprofen (ADVIL,MOTRIN) 800 MG tablet       TK 1 T PO BID    TK 1 T PO BID      Objective:   /85 (BP Location: Left arm, Patient Position: Sitting, BP Method: Medium (Automatic))   Pulse 87   Temp 98.8 °F (37.1 °C)   Ht 5' 9" (1.753 m)   Wt 88.1 kg (194 lb 2 oz)   SpO2 97%   BMI 28.67 kg/m²    Estimated body mass index is 28.67 kg/m² as calculated from the following:    Height as of this encounter: 5' 9" (1.753 m).    Weight as of this encounter: 88.1 kg (194 lb 2 oz).   Physical Exam   Constitutional: He is oriented to person, place, and time. He appears well-developed and well-nourished.   HENT:   Head: Normocephalic and atraumatic.   Right Ear: Hearing and " tympanic membrane normal.   Left Ear: Hearing and tympanic membrane normal.   Nose: Nose normal.   Mouth/Throat: Uvula is midline, oropharynx is clear and moist and mucous membranes are normal.   Eyes: Pupils are equal, round, and reactive to light. Conjunctivae and EOM are normal.   Neck: Normal range of motion. Neck supple.   Cardiovascular: Normal rate, regular rhythm and normal heart sounds.   Pulmonary/Chest: Breath sounds normal. He has no wheezes. He has no rales.   Abdominal: Soft. Bowel sounds are normal. He exhibits no distension and no mass. There is no tenderness. There is no guarding.   Musculoskeletal: Normal range of motion. He exhibits no edema or tenderness.   Neurological: He is alert and oriented to person, place, and time. No cranial nerve deficit.   Skin: Skin is warm and dry. No rash noted. No erythema.   Psychiatric: He has a normal mood and affect. His speech is normal and behavior is normal. Judgment and thought content normal. Cognition and memory are normal.   Nursing note and vitals reviewed.    Lab Results   Component Value Date    WBC 5.21 01/06/2020    HGB 14.9 01/06/2020    HCT 42.9 01/06/2020     01/06/2020    CHOL 210 (H) 01/06/2020    TRIG 192 (H) 01/06/2020    HDL 42 (L) 01/06/2020    AST 18 01/06/2020     01/06/2020    K 4.5 01/06/2020     01/06/2020    CREATININE 1.10 01/06/2020    BUN 29.1 (H) 01/06/2020    CO2 25 01/06/2020    TSH 3.54 01/06/2020      Assessment:      Problem List Items Addressed This Visit        Cardiac/Vascular    Essential hypertension - Primary    Relevant Orders    Exercise Stress - EKG    Mixed hyperlipidemia    Relevant Orders    Exercise Stress - EKG       ID    Influenza       GI    GERD without esophagitis       Other    Primary insomnia    Chest pain    Relevant Orders    Exercise Stress - EKG    EKG 12-lead           Plan:   Hypertension is chronic and currently well controlled on current medication.  Hyperlipidemia is chronic  and fairly well controlled on fenofibrate and pravastatin.  Tamiflu 75 mg twice daily for 5 days for influenza.  Trazodone 300 mg nightly for insomnia.  Pantoprazole 40 mg daily for GERD.  I will order an exercise stress test to evaluate for ischemia an EKG was performed in clinic and it revealed normal sinus rhythm with no significant ST changes.

## 2020-02-10 ENCOUNTER — OFFICE VISIT (OUTPATIENT)
Dept: UROLOGY | Facility: CLINIC | Age: 65
End: 2020-02-10
Payer: MEDICARE

## 2020-02-10 VITALS
RESPIRATION RATE: 18 BRPM | BODY MASS INDEX: 28.73 KG/M2 | SYSTOLIC BLOOD PRESSURE: 145 MMHG | WEIGHT: 194 LBS | HEIGHT: 69 IN | HEART RATE: 77 BPM | DIASTOLIC BLOOD PRESSURE: 67 MMHG

## 2020-02-10 DIAGNOSIS — R35.0 URINARY FREQUENCY: ICD-10-CM

## 2020-02-10 PROCEDURE — 99214 OFFICE O/P EST MOD 30 MIN: CPT | Mod: S$GLB,,, | Performed by: SPECIALIST

## 2020-02-10 PROCEDURE — 99214 PR OFFICE/OUTPT VISIT, EST, LEVL IV, 30-39 MIN: ICD-10-PCS | Mod: S$GLB,,, | Performed by: SPECIALIST

## 2020-02-10 RX ORDER — OXYBUTYNIN CHLORIDE 10 MG/1
10 TABLET, EXTENDED RELEASE ORAL DAILY
Qty: 30 TABLET | Refills: 11 | Status: SHIPPED | OUTPATIENT
Start: 2020-02-10 | End: 2020-12-10 | Stop reason: SDUPTHER

## 2020-02-10 NOTE — PROGRESS NOTES
Subjective:       Patient ID: Marcello Dahl is a 64 y.o. male.    Chief Complaint: Follow-up (6 wk f/u)      HPI: 64-year-old male known to service Dr. Hidalgo returned today follow-up dosage increase the Ditropan to 10 mg daily for symptoms of frequency.  Patient reports all is well since the increased dose.  He sleeps through the night now.  Stream is of good caliber in easy to start.  Denies dysuria, frequency, urgency, incontinence or gross hematuria.  No other urologic complaints today.       Past Medical History:   Past Medical History:   Diagnosis Date    Enlarged prostate     Hyperlipemia     Hypertension     Hypertriglyceridemia     Insomnia     Vitamin D deficiency        Past Surgical Historical:   Past Surgical History:   Procedure Laterality Date    HERNIA REPAIR      SHOULDER SURGERY          Medications:   Medication List with Changes/Refills   Current Medications    BENZONATATE (TESSALON) 200 MG CAPSULE    Take 200 mg by mouth 3 (three) times daily as needed.    CARTIA  MG 24 HR CAPSULE        DILTIAZEM (CARDIZEM CD) 360 MG 24 HR CAPSULE    Take 1 capsule (360 mg total) by mouth once daily.    DILTIAZEM (TIAZAC) 240 MG CS24    TK 1 C PO QD    FENOFIBRATE 160 MG TAB    fenofibrate 160 mg tablet   Take 1 tablet every day by oral route.    HYDROCODONE-ACETAMINOPHEN (NORCO) 7.5-325 MG PER TABLET    Take 1 tablet by mouth every 6 (six) hours as needed for Pain.    IBUPROFEN (ADVIL,MOTRIN) 800 MG TABLET    TK 1 T PO BID    LOSARTAN (COZAAR) 100 MG TABLET    TK 1 T PO QD    MONTELUKAST (SINGULAIR) 10 MG TABLET    TAKE 1 TABLET BY MOUTH EVERY EVENING    PANTOPRAZOLE (PROTONIX) 40 MG TABLET    Take 1 tablet (40 mg total) by mouth once daily.    PRAVASTATIN (PRAVACHOL) 20 MG TABLET    TAKE 1 TABLET BY MOUTH EVERY DAY    TIZANIDINE (ZANAFLEX) 2 MG TABLET    Take 4 mg by mouth every 8 (eight) hours as needed.    TRAMADOL (ULTRAM) 50 MG TABLET    TAKE 1 TABLET BY MOUTH TWICE DAILY    TRAZODONE  (DESYREL) 300 MG TABLET    Take 1 tablet (300 mg total) by mouth every evening.    ZOLPIDEM (AMBIEN) 10 MG TAB    TAKE 1 TABLET BY MOUTH NIGHTLY AS NEEDED   Changed and/or Refilled Medications    Modified Medication Previous Medication    OXYBUTYNIN (DITROPAN-XL) 10 MG 24 HR TABLET oxybutynin (DITROPAN-XL) 10 MG 24 hr tablet       Take 1 tablet (10 mg total) by mouth once daily.    Take 1 tablet (10 mg total) by mouth once daily.   Discontinued Medications    OXYBUTYNIN (DITROPAN-XL) 5 MG TR24    TK 1 T PO QD        Past Social History:   Social History     Socioeconomic History    Marital status:      Spouse name: Not on file    Number of children: Not on file    Years of education: Not on file    Highest education level: Not on file   Occupational History    Not on file   Social Needs    Financial resource strain: Not on file    Food insecurity:     Worry: Not on file     Inability: Not on file    Transportation needs:     Medical: Not on file     Non-medical: Not on file   Tobacco Use    Smoking status: Former Smoker   Substance and Sexual Activity    Alcohol use: Never     Frequency: Never    Drug use: Never    Sexual activity: Not on file   Lifestyle    Physical activity:     Days per week: Not on file     Minutes per session: Not on file    Stress: Not on file   Relationships    Social connections:     Talks on phone: Not on file     Gets together: Not on file     Attends [employer] service: Not on file     Active member of club or organization: Not on file     Attends meetings of clubs or organizations: Not on file     Relationship status:    Other Topics Concern    Not on file   Social History Narrative    Not on file       Allergies: Review of patient's allergies indicates:  No Known Allergies     Family History: History reviewed. No pertinent family history.     Review of Systems:  Review of Systems   Constitutional: Negative for activity change and appetite change.   HENT:  Negative for congestion and dental problem.    Eyes: Negative for visual disturbance.   Respiratory: Negative for chest tightness and shortness of breath.    Cardiovascular: Negative for chest pain.   Gastrointestinal: Negative for abdominal distention and abdominal pain.   Genitourinary: Negative for decreased urine volume, difficulty urinating, discharge, dysuria, enuresis, flank pain, frequency, genital sores, hematuria, penile pain, penile swelling, scrotal swelling, testicular pain and urgency.   Musculoskeletal: Negative for back pain and neck pain.   Skin: Negative for color change.   Neurological: Negative for dizziness.   Hematological: Negative for adenopathy.   Psychiatric/Behavioral: Negative for agitation, behavioral problems and confusion.       Physical Exam:  Physical Exam   Constitutional: He is oriented to person, place, and time. He appears well-developed and well-nourished.   HENT:   Head: Normocephalic.   Eyes: No scleral icterus.   Neck: Normal range of motion.   Cardiovascular: Intact distal pulses.    Pulmonary/Chest: Effort normal and breath sounds normal.   Abdominal: Soft. He exhibits no distension. There is no tenderness. No hernia. Hernia confirmed negative in the right inguinal area and confirmed negative in the left inguinal area.   Genitourinary: Testes normal and penis normal. Cremasteric reflex is present.   Neurological: He is alert and oriented to person, place, and time.   Skin: Skin is warm and dry.     Psychiatric: He has a normal mood and affect.       Assessment/Plan:   Urinary frequency--6 week follow-up today after dosage increase of Ditropan to 10 mg.  Patient is now doing well symptom-free.  Refilled his Ditropan, return clinic 6 months.  BPH without obstruction--patient asymptomatic.  Up-to-date on KAT/PSA    Problem List Items Addressed This Visit     None

## 2020-02-19 RX ORDER — TIZANIDINE 2 MG/1
2 TABLET ORAL EVERY 8 HOURS PRN
Qty: 90 TABLET | Refills: 1 | Status: SHIPPED | OUTPATIENT
Start: 2020-02-19 | End: 2020-06-18

## 2020-02-19 NOTE — TELEPHONE ENCOUNTER
----- Message from Antoinette Casper sent at 2/19/2020  9:18 AM CST -----  Contact: patient  Would like a refill on Tizanidine 2mg also states the Ambien is to strong would like something else  Pharmacy [facility] in [place]

## 2020-02-28 NOTE — TELEPHONE ENCOUNTER
----- Message from Antoinette Casper sent at 2/28/2020  1:34 PM CST -----  Contact: patient  Needs a refill on Losartan 100mg  Pharmacy [facility] in [place]

## 2020-03-02 ENCOUNTER — TELEPHONE (OUTPATIENT)
Dept: FAMILY MEDICINE | Facility: CLINIC | Age: 65
End: 2020-03-02

## 2020-03-02 DIAGNOSIS — M15.9 PRIMARY OSTEOARTHRITIS INVOLVING MULTIPLE JOINTS: Primary | ICD-10-CM

## 2020-03-02 RX ORDER — LOSARTAN POTASSIUM 100 MG/1
TABLET ORAL
Qty: 90 TABLET | Refills: 3 | Status: SHIPPED | OUTPATIENT
Start: 2020-03-02

## 2020-03-02 RX ORDER — IBUPROFEN 800 MG/1
TABLET ORAL
Qty: 60 TABLET | Refills: 5 | Status: SHIPPED | OUTPATIENT
Start: 2020-03-02

## 2020-03-02 NOTE — TELEPHONE ENCOUNTER
----- Message from Antoinette Casper sent at 3/2/2020  1:32 PM CST -----  Contact: patient  [facility] states the [facility] [place] is not refilling his Ibuprofen 800mg.stating that it is  because he waited over a month to refill it. Instead of taking 2 a day he is taking 1 a day. States he needs a new script  Pharmacy [facility] in [place]

## 2020-03-25 DIAGNOSIS — S39.012A STRAIN OF LUMBAR REGION, INITIAL ENCOUNTER: ICD-10-CM

## 2020-03-25 RX ORDER — HYDROCODONE BITARTRATE AND ACETAMINOPHEN 7.5; 325 MG/1; MG/1
1 TABLET ORAL EVERY 6 HOURS PRN
Qty: 28 TABLET | Refills: 0 | Status: SHIPPED | OUTPATIENT
Start: 2020-03-25

## 2020-03-25 NOTE — TELEPHONE ENCOUNTER
----- Message from Antoinette Casper sent at 3/25/2020  1:58 PM CDT -----  Contact: patient  Needs a refill on Hydrocodone-Acetaminophen  Pharmacy [facility] in [place]

## 2020-04-07 DIAGNOSIS — F51.01 PRIMARY INSOMNIA: ICD-10-CM

## 2020-04-07 RX ORDER — DILTIAZEM HYDROCHLORIDE 240 MG/1
240 CAPSULE, EXTENDED RELEASE ORAL DAILY
Qty: 90 CAPSULE | Refills: 1 | Status: SHIPPED | OUTPATIENT
Start: 2020-04-07 | End: 2020-10-13

## 2020-04-07 RX ORDER — ZOLPIDEM TARTRATE 10 MG/1
TABLET ORAL
Qty: 30 TABLET | Refills: 0 | Status: SHIPPED | OUTPATIENT
Start: 2020-04-07 | End: 2020-05-19 | Stop reason: SDUPTHER

## 2020-04-07 NOTE — TELEPHONE ENCOUNTER
----- Message from Antoinette Casper sent at 4/7/2020  8:47 AM CDT -----  Contact: patient  Patient needs a refill on Cartia XT 240mg, & Ambien 10mg  Pharmacy [facility] in [place]

## 2020-05-06 ENCOUNTER — PATIENT OUTREACH (OUTPATIENT)
Dept: ADMINISTRATIVE | Facility: HOSPITAL | Age: 65
End: 2020-05-06

## 2020-05-19 ENCOUNTER — OFFICE VISIT (OUTPATIENT)
Dept: INTERNAL MEDICINE | Facility: CLINIC | Age: 65
End: 2020-05-19
Payer: MEDICARE

## 2020-05-19 VITALS
HEIGHT: 69 IN | BODY MASS INDEX: 28.29 KG/M2 | TEMPERATURE: 99 F | OXYGEN SATURATION: 98 % | WEIGHT: 191 LBS | HEART RATE: 80 BPM | DIASTOLIC BLOOD PRESSURE: 84 MMHG | SYSTOLIC BLOOD PRESSURE: 158 MMHG

## 2020-05-19 DIAGNOSIS — I10 ESSENTIAL HYPERTENSION: Primary | ICD-10-CM

## 2020-05-19 DIAGNOSIS — F51.01 PRIMARY INSOMNIA: ICD-10-CM

## 2020-05-19 DIAGNOSIS — R42 DIZZINESS: ICD-10-CM

## 2020-05-19 DIAGNOSIS — H90.0 CONDUCTIVE HEARING LOSS, BILATERAL: ICD-10-CM

## 2020-05-19 DIAGNOSIS — R20.0 NUMBNESS OF FINGERS OF BOTH HANDS: ICD-10-CM

## 2020-05-19 PROCEDURE — 99214 OFFICE O/P EST MOD 30 MIN: CPT | Mod: S$GLB,,, | Performed by: INTERNAL MEDICINE

## 2020-05-19 PROCEDURE — 99214 PR OFFICE/OUTPT VISIT, EST, LEVL IV, 30-39 MIN: ICD-10-PCS | Mod: S$GLB,,, | Performed by: INTERNAL MEDICINE

## 2020-05-19 RX ORDER — GABAPENTIN 300 MG/1
300 CAPSULE ORAL 3 TIMES DAILY
Qty: 90 CAPSULE | Refills: 11 | Status: SHIPPED | OUTPATIENT
Start: 2020-05-19 | End: 2023-07-25

## 2020-05-19 RX ORDER — HYDROCHLOROTHIAZIDE 25 MG/1
25 TABLET ORAL DAILY
Qty: 30 TABLET | Refills: 11 | Status: SHIPPED | OUTPATIENT
Start: 2020-05-19 | End: 2021-06-10

## 2020-05-19 RX ORDER — ZOLPIDEM TARTRATE 10 MG/1
TABLET ORAL
Qty: 30 TABLET | Refills: 0 | Status: SHIPPED | OUTPATIENT
Start: 2020-05-19 | End: 2020-06-18 | Stop reason: SDUPTHER

## 2020-05-19 NOTE — PROGRESS NOTES
Subjective:      Patient ID: Marcello Dahl is a 65 y.o. male.    Chief Complaint: Nasal Congestion (Post nasal drip); Numbness (Bilateral hands); and Gait Problem    HPI:  Patient with h/o HTN with BP not monitored at home. Patient takes the medicien regularly.     Patient reports having numbness in bilateral hands for last few years.Patient also has weakness in hand, no dropping things, no neck pain. Patient repots No headache, but has some dizziness, Patient reports he had a few episodes when he felt that he is moving/drifting to the right side while walking. The episode lasted fro 3-4 minutes and improved on its own. Patient has decreased hearing bilateral ears probably secondary to Noise exposure. Patient is using hearing aids. Patient denies ringing in ears.     Patient reports taking Ambien for sleep. Patient goes to bed at 8pm and wakes up at 5 am . Patient does not sleep well and needs ambien.     Review of Systems   Constitutional: Negative for chills, diaphoresis, fever, malaise/fatigue and weight loss.   HENT: Positive for congestion. Negative for ear pain, sinus pain, sore throat and tinnitus.    Eyes: Negative for blurred vision and photophobia.   Respiratory: Positive for cough. Negative for hemoptysis, shortness of breath and wheezing.    Cardiovascular: Negative for chest pain, palpitations, orthopnea, leg swelling and PND.   Gastrointestinal: Negative for abdominal pain, blood in stool, constipation, diarrhea, heartburn, melena, nausea and vomiting.   Genitourinary: Positive for frequency. Negative for dysuria and urgency.   Musculoskeletal: Negative for back pain, myalgias and neck pain.   Skin: Negative for rash.   Neurological: Positive for tingling. Negative for dizziness, tremors, seizures, loss of consciousness and weakness.   Endo/Heme/Allergies: Negative for polydipsia.   Psychiatric/Behavioral: Negative for depression and hallucinations. The patient has insomnia.      Objective:      Physical Exam   Constitutional: He is oriented to person, place, and time. No distress.   HENT:   Nose: No sinus tenderness.   Decreased hearing   Neck: Normal range of motion. Neck supple. No thyromegaly present.   Cardiovascular: Normal rate and regular rhythm.   Pulmonary/Chest: Effort normal and breath sounds normal. No respiratory distress. He has no wheezes. He has no rales.   Abdominal: Soft. Bowel sounds are normal. He exhibits no distension. There is no tenderness.   Musculoskeletal: He exhibits no edema.   Neurological: He is alert and oriented to person, place, and time. He displays normal reflexes. No cranial nerve deficit or sensory deficit.   Phalen and Tinel sign negative.  Tandem walk is slightly unstable  Romberg is negative  Finger to nose test is normal.   Skin: He is not diaphoretic.   Psychiatric: He has a normal mood and affect. His behavior is normal. Judgment and thought content normal.   Vitals reviewed.    Assessment:       ICD-10-CM ICD-9-CM   1. Essential hypertension I10 401.9   2. Primary insomnia F51.01 307.42   3. Numbness of fingers of both hands R20.0 782.0   4. Dizziness R42 780.4   5. Conductive hearing loss, bilateral  H90.0 389.06       Plan:   Patient blood pressures are not under control.  Will add hydrochlorothiazide  Patient has insomnia and is using Ambien.  Will continue medication for now  Advised patient about possible side effect of the medication  Will try to taper off the medication  Patient has bilateral finger numbness.  Will order nerve conduction study  Will use gabapentin for neuropathy  Will refer to audiologist for evaluation of dizziness + hearing        Medication List with Changes/Refills   New Medications    GABAPENTIN (NEURONTIN) 300 MG CAPSULE    Take 1 capsule (300 mg total) by mouth 3 (three) times daily.    HYDROCHLOROTHIAZIDE (HYDRODIURIL) 25 MG TABLET    Take 1 tablet (25 mg total) by mouth once daily.   Current Medications    CARTIA  MG 24  HR CAPSULE    Take 1 capsule (240 mg total) by mouth once daily.    HYDROCODONE-ACETAMINOPHEN (NORCO) 7.5-325 MG PER TABLET    Take 1 tablet by mouth every 6 (six) hours as needed for Pain.    IBUPROFEN (ADVIL,MOTRIN) 800 MG TABLET    TK 1 T PO BID    LOSARTAN (COZAAR) 100 MG TABLET    TK 1 T PO QD    OXYBUTYNIN (DITROPAN-XL) 10 MG 24 HR TABLET    Take 1 tablet (10 mg total) by mouth once daily.    TIZANIDINE (ZANAFLEX) 2 MG TABLET    Take 1 tablet (2 mg total) by mouth every 8 (eight) hours as needed.   Changed and/or Refilled Medications    Modified Medication Previous Medication    ZOLPIDEM (AMBIEN) 10 MG TAB zolpidem (AMBIEN) 10 mg Tab       TAKE 1 TABLET BY MOUTH NIGHTLY AS NEEDED    TAKE 1 TABLET BY MOUTH NIGHTLY AS NEEDED   Discontinued Medications    BENZONATATE (TESSALON) 200 MG CAPSULE    Take 200 mg by mouth 3 (three) times daily as needed.    FENOFIBRATE 160 MG TAB    fenofibrate 160 mg tablet   Take 1 tablet every day by oral route.    MONTELUKAST (SINGULAIR) 10 MG TABLET    TAKE 1 TABLET BY MOUTH EVERY EVENING    PANTOPRAZOLE (PROTONIX) 40 MG TABLET    Take 1 tablet (40 mg total) by mouth once daily.    PRAVASTATIN (PRAVACHOL) 20 MG TABLET    TAKE 1 TABLET BY MOUTH EVERY DAY    TRAMADOL (ULTRAM) 50 MG TABLET    TAKE 1 TABLET BY MOUTH TWICE DAILY    TRAZODONE (DESYREL) 300 MG TABLET    Take 1 tablet (300 mg total) by mouth every evening.

## 2020-06-18 ENCOUNTER — OFFICE VISIT (OUTPATIENT)
Dept: INTERNAL MEDICINE | Facility: CLINIC | Age: 65
End: 2020-06-18
Payer: MEDICARE

## 2020-06-18 VITALS
HEART RATE: 61 BPM | DIASTOLIC BLOOD PRESSURE: 68 MMHG | WEIGHT: 193 LBS | TEMPERATURE: 98 F | RESPIRATION RATE: 16 BRPM | HEIGHT: 69 IN | OXYGEN SATURATION: 98 % | BODY MASS INDEX: 28.58 KG/M2 | SYSTOLIC BLOOD PRESSURE: 158 MMHG

## 2020-06-18 DIAGNOSIS — M54.50 CHRONIC RIGHT-SIDED LOW BACK PAIN WITHOUT SCIATICA: Primary | ICD-10-CM

## 2020-06-18 DIAGNOSIS — Z13.6 ENCOUNTER FOR ABDOMINAL AORTIC ANEURYSM (AAA) SCREENING: ICD-10-CM

## 2020-06-18 DIAGNOSIS — F51.01 PRIMARY INSOMNIA: ICD-10-CM

## 2020-06-18 DIAGNOSIS — I10 ESSENTIAL HYPERTENSION: ICD-10-CM

## 2020-06-18 DIAGNOSIS — Z11.59 NEED FOR HEPATITIS C SCREENING TEST: ICD-10-CM

## 2020-06-18 DIAGNOSIS — G89.29 CHRONIC RIGHT-SIDED LOW BACK PAIN WITHOUT SCIATICA: Primary | ICD-10-CM

## 2020-06-18 PROBLEM — R97.20 RAISED PROSTATE SPECIFIC ANTIGEN: Status: ACTIVE | Noted: 2020-06-18

## 2020-06-18 PROCEDURE — 99214 PR OFFICE/OUTPT VISIT, EST, LEVL IV, 30-39 MIN: ICD-10-PCS | Mod: S$GLB,,, | Performed by: INTERNAL MEDICINE

## 2020-06-18 PROCEDURE — 99214 OFFICE O/P EST MOD 30 MIN: CPT | Mod: S$GLB,,, | Performed by: INTERNAL MEDICINE

## 2020-06-18 RX ORDER — HYDRALAZINE HYDROCHLORIDE 50 MG/1
50 TABLET, FILM COATED ORAL EVERY 12 HOURS
Qty: 60 TABLET | Refills: 11 | Status: SHIPPED | OUTPATIENT
Start: 2020-06-18 | End: 2021-06-18

## 2020-06-18 RX ORDER — TIZANIDINE 4 MG/1
4 TABLET ORAL EVERY 6 HOURS PRN
Qty: 30 TABLET | Refills: 0 | Status: SHIPPED | OUTPATIENT
Start: 2020-06-18 | End: 2020-06-28

## 2020-06-18 RX ORDER — HYDRALAZINE HYDROCHLORIDE 50 MG/1
50 TABLET, FILM COATED ORAL 3 TIMES DAILY
Qty: 90 TABLET | Refills: 11 | Status: SHIPPED | OUTPATIENT
Start: 2020-06-18 | End: 2020-06-18

## 2020-06-18 RX ORDER — TIZANIDINE 2 MG/1
2 TABLET ORAL EVERY 8 HOURS PRN
Qty: 90 TABLET | Refills: 1 | Status: CANCELLED | OUTPATIENT
Start: 2020-06-18

## 2020-06-18 RX ORDER — ZOLPIDEM TARTRATE 10 MG/1
TABLET ORAL
Qty: 30 TABLET | Refills: 0 | Status: SHIPPED | OUTPATIENT
Start: 2020-06-18

## 2020-06-18 NOTE — PROGRESS NOTES
Subjective:      Patient ID: Marcello Dahl is a 65 y.o. male.    Chief Complaint: Muscle Pain       Patient with h/o HTN with home BP are running 140-150/70s. Patient denies any chest pain + shortness of breath, ankle swelling.     Patient reports having numbness in bilateral hands for last few years.Patient also has weakness in hand, no dropping things, no neck pain. Patient repots No headache, but has some dizziness, Patient reports he had a few episodes when he felt that he is moving/drifting to the right side while walking. The episode lasted fro 3-4 minutes and improved on its own. Patient was given Gabapentin and the numbness seems to have improved.     Patient complains of low back pain x months. Pain is on the right side, intermittent, can get severe, worsen with sitting down,  Patient takes Ibuprofen and Tizanidine with some help. Patient has some hydrocodone/APAP with some help. Patient has some prescribed by Dr. Vivas.  no radiation down the leg,  Patient reports taking Ambien for sleep. Patient goes to bed at 8pm and wakes up at 5 am . Patient does not sleep well and needs ambien.     Review of Systems   Constitutional: Negative for chills, diaphoresis, fever, malaise/fatigue and weight loss.   HENT: Positive for congestion. Negative for ear pain, sinus pain, sore throat and tinnitus.    Eyes: Negative for blurred vision and photophobia.   Respiratory: Positive for cough. Negative for hemoptysis, shortness of breath and wheezing.    Cardiovascular: Negative for chest pain, palpitations, orthopnea, leg swelling and PND.   Gastrointestinal: Negative for abdominal pain, blood in stool, constipation, diarrhea, heartburn, melena, nausea and vomiting.   Genitourinary: Positive for frequency. Negative for dysuria and urgency.   Musculoskeletal: Negative for back pain, myalgias and neck pain.   Skin: Negative for rash.   Neurological: Positive for tingling. Negative for dizziness, tremors, seizures, loss of  consciousness and weakness.   Endo/Heme/Allergies: Negative for polydipsia.   Psychiatric/Behavioral: Negative for depression and hallucinations. The patient has insomnia.      Objective:     Physical Exam  Vitals signs reviewed.   Constitutional:       General: He is not in acute distress.     Appearance: He is not diaphoretic.   Neck:      Musculoskeletal: Normal range of motion and neck supple.      Thyroid: No thyromegaly.   Cardiovascular:      Rate and Rhythm: Normal rate and regular rhythm.   Pulmonary:      Effort: Pulmonary effort is normal. No respiratory distress.      Breath sounds: Normal breath sounds. No wheezing or rales.   Abdominal:      General: Bowel sounds are normal. There is no distension.      Palpations: Abdomen is soft.      Tenderness: There is no abdominal tenderness.   Musculoskeletal:      Comments: Right lumbar area tenderness.     Neurological:      Mental Status: He is alert and oriented to person, place, and time.   Psychiatric:         Behavior: Behavior normal.         Thought Content: Thought content normal.         Judgment: Judgment normal.       Assessment:       ICD-10-CM ICD-9-CM   1. Chronic right-sided low back pain without sciatica  M54.5 724.2    G89.29 338.29   2. Primary insomnia  F51.01 307.42   3. Need for hepatitis C screening test  Z11.59 V73.89   4. Encounter for abdominal aortic aneurysm (AAA) screening  Z13.6 V81.2   5. Essential hypertension  I10 401.9       Plan:   Patient blood pressures are not under control.  Will add Hydralazine  Patient has insomnia and is using Ambien.  Will continue medication for now  Advised patient about possible side effect of the medication  Will try to taper off the medication  Patient has severe back pain.  Will use tizanidine +ibuprofen for pain.  Patient has some hydrocodone for severe pain.  Advised to take medicine when really needed  Advised patient about possible side effect to the medication  Will order x-ray lumbar  spine  Will screen for hepatitis-C and abdominal aortic aneurysm        Medication List with Changes/Refills   New Medications    HYDRALAZINE (APRESOLINE) 50 MG TABLET    Take 1 tablet (50 mg total) by mouth 3 (three) times daily.    TIZANIDINE (ZANAFLEX) 4 MG TABLET    Take 1 tablet (4 mg total) by mouth every 6 (six) hours as needed.   Current Medications    CARTIA  MG 24 HR CAPSULE    Take 1 capsule (240 mg total) by mouth once daily.    GABAPENTIN (NEURONTIN) 300 MG CAPSULE    Take 1 capsule (300 mg total) by mouth 3 (three) times daily.    HYDROCHLOROTHIAZIDE (HYDRODIURIL) 25 MG TABLET    Take 1 tablet (25 mg total) by mouth once daily.    HYDROCODONE-ACETAMINOPHEN (NORCO) 7.5-325 MG PER TABLET    Take 1 tablet by mouth every 6 (six) hours as needed for Pain.    IBUPROFEN (ADVIL,MOTRIN) 800 MG TABLET    TK 1 T PO BID    LOSARTAN (COZAAR) 100 MG TABLET    TK 1 T PO QD    OXYBUTYNIN (DITROPAN-XL) 10 MG 24 HR TABLET    Take 1 tablet (10 mg total) by mouth once daily.   Changed and/or Refilled Medications    Modified Medication Previous Medication    ZOLPIDEM (AMBIEN) 10 MG TAB zolpidem (AMBIEN) 10 mg Tab       TAKE 1 TABLET BY MOUTH NIGHTLY AS NEEDED    TAKE 1 TABLET BY MOUTH NIGHTLY AS NEEDED   Discontinued Medications    TIZANIDINE (ZANAFLEX) 2 MG TABLET    Take 1 tablet (2 mg total) by mouth every 8 (eight) hours as needed.

## 2020-06-19 ENCOUNTER — TELEPHONE (OUTPATIENT)
Dept: INTERNAL MEDICINE | Facility: CLINIC | Age: 65
End: 2020-06-19

## 2020-06-25 ENCOUNTER — TELEPHONE (OUTPATIENT)
Dept: INTERNAL MEDICINE | Facility: CLINIC | Age: 65
End: 2020-06-25

## 2020-06-25 NOTE — TELEPHONE ENCOUNTER
----- Message from Tricia Cruz sent at 6/25/2020  9:48 AM CDT -----  Contact: self  Requesting call back regarding questions on which medication pt suppose to stop taking. States he has called several times with no response. [phone].

## 2020-06-25 NOTE — TELEPHONE ENCOUNTER
----- Message from Blake Xavier sent at 6/24/2020  2:14 PM CDT -----  Regarding: Medication  Please call Marcello to discuss a medication question he has [phone] (home).

## 2020-08-10 ENCOUNTER — OFFICE VISIT (OUTPATIENT)
Dept: UROLOGY | Facility: CLINIC | Age: 65
End: 2020-08-10
Payer: MEDICARE

## 2020-08-10 VITALS
DIASTOLIC BLOOD PRESSURE: 77 MMHG | HEIGHT: 69 IN | WEIGHT: 193 LBS | BODY MASS INDEX: 28.58 KG/M2 | SYSTOLIC BLOOD PRESSURE: 158 MMHG | HEART RATE: 72 BPM | RESPIRATION RATE: 18 BRPM

## 2020-08-10 DIAGNOSIS — N40.0 BPH WITHOUT URINARY OBSTRUCTION: ICD-10-CM

## 2020-08-10 DIAGNOSIS — R35.0 URINARY FREQUENCY: Primary | ICD-10-CM

## 2020-08-10 DIAGNOSIS — N32.81 OVERACTIVE BLADDER: ICD-10-CM

## 2020-08-10 PROCEDURE — 51798 PR MEAS,POST-VOID RES,US,NON-IMAGING: ICD-10-PCS | Mod: S$GLB,,, | Performed by: SPECIALIST

## 2020-08-10 PROCEDURE — 99213 PR OFFICE/OUTPT VISIT, EST, LEVL III, 20-29 MIN: ICD-10-PCS | Mod: 25,S$GLB,, | Performed by: SPECIALIST

## 2020-08-10 PROCEDURE — 99213 OFFICE O/P EST LOW 20 MIN: CPT | Mod: 25,S$GLB,, | Performed by: SPECIALIST

## 2020-08-10 PROCEDURE — 51798 US URINE CAPACITY MEASURE: CPT | Mod: S$GLB,,, | Performed by: SPECIALIST

## 2020-08-10 RX ORDER — ATORVASTATIN CALCIUM 20 MG/1
TABLET, FILM COATED ORAL
COMMUNITY
Start: 2020-07-21

## 2020-08-10 RX ORDER — CEFDINIR 300 MG/1
CAPSULE ORAL
COMMUNITY
Start: 2020-07-08 | End: 2020-12-10

## 2020-08-10 RX ORDER — FLUTICASONE PROPIONATE 50 MCG
SPRAY, SUSPENSION (ML) NASAL
COMMUNITY
Start: 2020-08-03 | End: 2023-07-25

## 2020-08-10 NOTE — PROGRESS NOTES
Subjective:       Patient ID: Marcello Dahl is a 65 y.o. male.    Chief Complaint: Follow-up (6mth f/u, no issues)      HPI:  65-year-old  man who is here for six-month follow-up.  He has BPH without obstruction but has been experiencing storage lower urinary tract symptoms including nocturia frequency and urgency is.  He does not have any voiding symptoms.  So he is not taking any alpha blockers.  We tried him on lifestyle modifications and conservative treatments.  He is currently on Ditropan 10 mg extended release once a day.  He was started on 5 mg then when we met last December we increased it to 10 mg.    He is up-to-date on digital rectal exam and PSA check before which with performed in December of 2019.    His bladder scan here in office today was 196 cc.  Was asked to go to the bathroom again BladderScan at time decreased to 60 cc.    Past Medical History:   Past Medical History:   Diagnosis Date    Enlarged prostate     Hyperlipemia     Hypertension     Hypertriglyceridemia     Insomnia     Vitamin D deficiency        Past Surgical Historical:   Past Surgical History:   Procedure Laterality Date    HERNIA REPAIR      SHOULDER SURGERY          Medications:   Medication List with Changes/Refills   Current Medications    ATORVASTATIN (LIPITOR) 20 MG TABLET        CARTIA  MG 24 HR CAPSULE    Take 1 capsule (240 mg total) by mouth once daily.    CEFDINIR (OMNICEF) 300 MG CAPSULE        FLUTICASONE PROPIONATE (FLONASE) 50 MCG/ACTUATION NASAL SPRAY        GABAPENTIN (NEURONTIN) 300 MG CAPSULE    Take 1 capsule (300 mg total) by mouth 3 (three) times daily.    HYDRALAZINE (APRESOLINE) 50 MG TABLET    Take 1 tablet (50 mg total) by mouth every 12 (twelve) hours.    HYDROCHLOROTHIAZIDE (HYDRODIURIL) 25 MG TABLET    Take 1 tablet (25 mg total) by mouth once daily.    HYDROCODONE-ACETAMINOPHEN (NORCO) 7.5-325 MG PER TABLET    Take 1 tablet by mouth every 6 (six) hours as needed for Pain.     IBUPROFEN (ADVIL,MOTRIN) 800 MG TABLET    TK 1 T PO BID    LOSARTAN (COZAAR) 100 MG TABLET    TK 1 T PO QD    OXYBUTYNIN (DITROPAN-XL) 10 MG 24 HR TABLET    Take 1 tablet (10 mg total) by mouth once daily.    ZOLPIDEM (AMBIEN) 10 MG TAB    TAKE 1 TABLET BY MOUTH NIGHTLY AS NEEDED        Past Social History:   Social History     Socioeconomic History    Marital status:      Spouse name: Not on file    Number of children: Not on file    Years of education: Not on file    Highest education level: Not on file   Occupational History    Not on file   Social Needs    Financial resource strain: Not on file    Food insecurity     Worry: Not on file     Inability: Not on file    Transportation needs     Medical: Not on file     Non-medical: Not on file   Tobacco Use    Smoking status: Former Smoker   Substance and Sexual Activity    Alcohol use: Never     Frequency: Never    Drug use: Never    Sexual activity: Not on file   Lifestyle    Physical activity     Days per week: Not on file     Minutes per session: Not on file    Stress: Not on file   Relationships    Social connections     Talks on phone: Not on file     Gets together: Not on file     Attends Congregation service: Not on file     Active member of club or organization: Not on file     Attends meetings of clubs or organizations: Not on file     Relationship status:    Other Topics Concern    Not on file   Social History Narrative    Not on file       Allergies: Review of patient's allergies indicates:  No Known Allergies     Family History: History reviewed. No pertinent family history.     Review of Systems:   systems reviewed and notable for storage lower urinary tract symptoms  All other systems were reviewed Neg except as stated in the HPI    Physical Exam:  General: A&Ox3. No apparent distress. No deformities.  Neck: No masses. Normal thyroid.  Lungs: normal inspiration. No use of accessory muscles.  Heart: normal pulse. No  arrhythmias.  Abdomen: Soft. NT. ND. No masses. No hernias. No hepatosplenomegaly.  Lymphatic: Neck and groin nodes negative.  Skin: The skin is warm and dry. No jaundice.  Neurology: Cranial nerves 2-12 crossly intact, no focal weaknesses, no sensation deficits, no motor deficits  Ext: No clubbing, cyanosis or edema.  :  Deferred      Assessment/Plan:       65-year-old man with storage LUTS and BPH without obstruction.    1.  BladderScan today showed that he could benefit from double voiding we recommended that for him today.  2.  Continue oxybutynin 10 mg XL daily.  3.  Return to clinic in 4 months (December 2020) for digital rectal exam, PSA check, symptom check.    Problem List Items Addressed This Visit        Renal/    Overactive bladder    Urinary frequency - Primary      Other Visit Diagnoses     BPH without urinary obstruction

## 2020-08-11 LAB
POC RESIDUAL URINE VOLUME: 196 ML (ref 0–100)
POC RESIDUAL URINE VOLUME: 60 ML (ref 0–100)

## 2020-12-10 ENCOUNTER — OFFICE VISIT (OUTPATIENT)
Dept: UROLOGY | Facility: CLINIC | Age: 65
End: 2020-12-10
Payer: MEDICARE

## 2020-12-10 ENCOUNTER — TELEPHONE (OUTPATIENT)
Dept: UROLOGY | Facility: CLINIC | Age: 65
End: 2020-12-10

## 2020-12-10 VITALS
HEIGHT: 69 IN | BODY MASS INDEX: 28.58 KG/M2 | SYSTOLIC BLOOD PRESSURE: 163 MMHG | RESPIRATION RATE: 18 BRPM | DIASTOLIC BLOOD PRESSURE: 82 MMHG | WEIGHT: 193 LBS | HEART RATE: 63 BPM

## 2020-12-10 DIAGNOSIS — N32.81 OVERACTIVE BLADDER: ICD-10-CM

## 2020-12-10 DIAGNOSIS — N13.8 BPH WITH OBSTRUCTION/LOWER URINARY TRACT SYMPTOMS: Primary | ICD-10-CM

## 2020-12-10 DIAGNOSIS — N40.1 BPH WITH OBSTRUCTION/LOWER URINARY TRACT SYMPTOMS: Primary | ICD-10-CM

## 2020-12-10 LAB
POC RESIDUAL URINE VOLUME: 25 ML (ref 0–100)
PSA, DIAGNOSTIC: 2.31 NG/ML (ref 0–4)

## 2020-12-10 PROCEDURE — 51798 POCT BLADDER SCAN: ICD-10-PCS | Mod: S$GLB,,, | Performed by: NURSE PRACTITIONER

## 2020-12-10 PROCEDURE — 99213 PR OFFICE/OUTPT VISIT, EST, LEVL III, 20-29 MIN: ICD-10-PCS | Mod: S$GLB,,, | Performed by: NURSE PRACTITIONER

## 2020-12-10 PROCEDURE — 36415 COLL VENOUS BLD VENIPUNCTURE: CPT | Mod: S$GLB,,, | Performed by: NURSE PRACTITIONER

## 2020-12-10 PROCEDURE — 99213 OFFICE O/P EST LOW 20 MIN: CPT | Mod: S$GLB,,, | Performed by: NURSE PRACTITIONER

## 2020-12-10 PROCEDURE — 51798 US URINE CAPACITY MEASURE: CPT | Mod: S$GLB,,, | Performed by: NURSE PRACTITIONER

## 2020-12-10 PROCEDURE — 36415 PR COLLECTION VENOUS BLOOD,VENIPUNCTURE: ICD-10-PCS | Mod: S$GLB,,, | Performed by: NURSE PRACTITIONER

## 2020-12-10 RX ORDER — OXYBUTYNIN CHLORIDE 10 MG/1
10 TABLET, EXTENDED RELEASE ORAL DAILY
Qty: 30 TABLET | Refills: 11 | Status: SHIPPED | OUTPATIENT
Start: 2020-12-10 | End: 2021-12-15

## 2020-12-10 RX ORDER — DILTIAZEM HYDROCHLORIDE 240 MG/1
CAPSULE, EXTENDED RELEASE ORAL
COMMUNITY
Start: 2020-10-14 | End: 2021-06-10 | Stop reason: SDUPTHER

## 2020-12-10 NOTE — PROGRESS NOTES
Patient seen and examined.  Clinical data reviewed.  Case discussed with the nurse practitioner.  I agree with assessment and plan.    CC 5-year-old man with BPH and bothersome lower urinary tract symptoms.  He is currently on oxybutynin for bladder symptoms.  Plan today will be to obtain a serum PSA, complete digital rectal exam, have patient return to clinic in 6 months.  Continue take the oxybutynin.

## 2020-12-10 NOTE — TELEPHONE ENCOUNTER
Contacted pt, advised of results. Pt verbalized understanding. BJP    ----- Message from Sonya Medel NP sent at 12/10/2020  1:46 PM CST -----  PSA stable at 2.31 ng/mL. Please call patient his PSA result

## 2020-12-10 NOTE — PROGRESS NOTES
Chief Complaint:   Chief Complaint   Patient presents with    Benign Prostatic Hypertrophy       HPI:  65-year-old  male known to the service of Dr. Hidalgo who presents for follow-up BPH.  He has BPH with lower urinary tract symptoms.  He experiences storage symptoms including nocturia, frequency, and urgency.  He does not have any voiding symptoms, so he is not taking any alpha blockers.  We tried him on lifestyle modifications and conservative treatments.  He avoids bladder irritants as he only drinks water.  He is currently maintained on Ditropan 10 mg extended release once daily.  He is tolerating the medication well without any significant side effects.  He is voiding to completion and emptying his bladder well.  He requests a refill on the medication today.    He is due for KAT and PSA today.  See PSA history below.  PSA history:  7/2019  1.97 ng/mL  12/30/2019 2.19 ng/mL    He denies any new urological complaints such as burning with urination, malodorous urine, flank pain, pelvic pain, hematuria, fever, or chills.    Allergies:  Patient has no known allergies.    Medications:  has a current medication list which includes the following prescription(s): atorvastatin, diltiazem, diltiazem, fluticasone propionate, gabapentin, hydralazine, hydrochlorothiazide, hydrocodone-acetaminophen, ibuprofen, losartan, oxybutynin, and zolpidem.    Review of Systems:  General: No fever, chills, vision changes, dizziness, weakness, fatigue, unexplained weight loss, confusion, or mood swings.  Skin: No rashes, itching, or changes in color/texture of skin.  Chest: Denies MARIEE, cyanosis, wheezing, cough, and sputum production.  Abdomen: Appetite fine. Denies diarrhea, abdominal pain, hematemesis, or blood in stool.  Musculoskeletal: No joint stiffness or swelling. No painful lymph nodes.  : reviewed and negative except as stated above in the HPI.  All other review of systems negative.    PMH:   has a past medical  history of Enlarged prostate, Hyperlipemia, Hypertension, Hypertriglyceridemia, Insomnia, and Vitamin D deficiency.    PSH:   has a past surgical history that includes Hernia repair and Shoulder surgery.    FamHx: family history is not on file.    SocHx:  reports that he has quit smoking. He does not have any smokeless tobacco history on file. He reports that he does not drink alcohol or use drugs.      Physical Exam:  Vitals:    12/10/20 0832   BP: (!) 163/82   Pulse: 63   Resp: 18     General: AAOx3, no apparent distress, no deformities  Neck: supple, no masses, normal thyroid, full ROM  Lungs: CTAB, no adventitious breath sounds, normal inspiration, no use of accessory muscles  Heart: regular rate and rhythm, no arrhythmias  Abdomen: soft, NT, ND, no masses, no hernias, no hepatosplenomegaly  Lymphatic: no unusually enlarged or tender lymph nodes  Skin: warm and dry, no jaundice, no rash  Ext: without edema or deformity, COLLINS, ambulates independently  : Normal rectal tone, no hemorrhoids. Prostate enlarged, mild firmness, smooth surface, no nodules or masses appreciated.     Labs/Studies: bladder scan PV 25mL    Impression/Plan:   BPH with obstruction/lower urinary tract symptoms  Comments:  stable, not on prostate meds at this time, KAT accomplished, PSA drawn and will call with results  Orders:  -     Prostate Specific Antigen, Diagnostic  -     POCT Bladder Scan  -     oxybutynin (DITROPAN-XL) 10 MG 24 hr tablet; Take 1 tablet (10 mg total) by mouth once daily.  Dispense: 30 tablet; Refill: 11    Overactive bladder  Comments:  maintained on oxybutynin XL 10mg which is working well for him- refill sent, bladder scan PV 25mL, continue avoiding bladder irritants    HTN: discussed and referred to PCP for further management.    Follow up in about 6 months (around 6/10/2021).

## 2021-03-01 RX ORDER — LOSARTAN POTASSIUM 100 MG/1
TABLET ORAL
Qty: 90 TABLET | Refills: 3 | OUTPATIENT
Start: 2021-03-01

## 2021-06-10 ENCOUNTER — OFFICE VISIT (OUTPATIENT)
Dept: UROLOGY | Facility: CLINIC | Age: 66
End: 2021-06-10
Payer: MEDICARE

## 2021-06-10 VITALS
BODY MASS INDEX: 29.62 KG/M2 | DIASTOLIC BLOOD PRESSURE: 73 MMHG | SYSTOLIC BLOOD PRESSURE: 140 MMHG | RESPIRATION RATE: 16 BRPM | HEIGHT: 69 IN | TEMPERATURE: 98 F | HEART RATE: 66 BPM | WEIGHT: 200 LBS

## 2021-06-10 DIAGNOSIS — N40.0 BENIGN PROSTATIC HYPERPLASIA, UNSPECIFIED WHETHER LOWER URINARY TRACT SYMPTOMS PRESENT: Primary | ICD-10-CM

## 2021-06-10 PROCEDURE — 99214 PR OFFICE/OUTPT VISIT, EST, LEVL IV, 30-39 MIN: ICD-10-PCS | Mod: S$GLB,,, | Performed by: NURSE PRACTITIONER

## 2021-06-10 PROCEDURE — 99214 OFFICE O/P EST MOD 30 MIN: CPT | Mod: S$GLB,,, | Performed by: NURSE PRACTITIONER

## 2021-10-26 ENCOUNTER — PATIENT OUTREACH (OUTPATIENT)
Dept: ADMINISTRATIVE | Facility: HOSPITAL | Age: 66
End: 2021-10-26
Payer: COMMERCIAL

## 2021-12-16 ENCOUNTER — PATIENT OUTREACH (OUTPATIENT)
Dept: ADMINISTRATIVE | Facility: HOSPITAL | Age: 66
End: 2021-12-16
Payer: COMMERCIAL

## 2023-06-02 DIAGNOSIS — N20.0 URIC ACID NEPHROLITHIASIS: Primary | ICD-10-CM

## 2023-07-25 ENCOUNTER — OFFICE VISIT (OUTPATIENT)
Dept: UROLOGY | Facility: CLINIC | Age: 68
End: 2023-07-25
Payer: MEDICARE

## 2023-07-25 DIAGNOSIS — N20.0 URIC ACID NEPHROLITHIASIS: ICD-10-CM

## 2023-07-25 DIAGNOSIS — N20.0 KIDNEY STONE: Primary | ICD-10-CM

## 2023-07-25 PROCEDURE — 99214 PR OFFICE/OUTPT VISIT, EST, LEVL IV, 30-39 MIN: ICD-10-PCS | Mod: S$GLB,,, | Performed by: UROLOGY

## 2023-07-25 PROCEDURE — 99214 OFFICE O/P EST MOD 30 MIN: CPT | Mod: S$GLB,,, | Performed by: UROLOGY

## 2023-07-25 RX ORDER — ALFUZOSIN HYDROCHLORIDE 10 MG/1
10 TABLET, EXTENDED RELEASE ORAL
COMMUNITY
Start: 2023-05-21

## 2023-07-25 RX ORDER — TIZANIDINE 4 MG/1
4 TABLET ORAL EVERY 8 HOURS PRN
COMMUNITY
Start: 2023-07-13

## 2023-07-25 RX ORDER — FINASTERIDE 5 MG/1
TABLET, FILM COATED ORAL
COMMUNITY
Start: 2023-05-09 | End: 2023-08-14 | Stop reason: SDUPTHER

## 2023-07-25 NOTE — PROGRESS NOTES
Patient educated, consent signed, pre-admission paperwork given. Patient verbalized understanding. DOS 8/7/23 at [facility]. Miki

## 2023-07-25 NOTE — PROGRESS NOTES
Subjective:       Patient ID: Marcello Dahl is a 68 y.o. male.    Chief Complaint: No chief complaint on file.      HPI: 68-year-old male patient following up for kidney stones.  Patient was seen by Dr. Nguyen and had a stent placed on 06/07/2023.  He is here for further evaluation and treatment.  He complains of left flank pain       Past Medical History:   Past Medical History:   Diagnosis Date    Enlarged prostate     Hyperlipemia     Hypertension     Hypertriglyceridemia     Insomnia     Vitamin D deficiency        Past Surgical Historical:   Past Surgical History:   Procedure Laterality Date    HERNIA REPAIR      SHOULDER SURGERY          Medications:   Medication List with Changes/Refills   Current Medications    ALFUZOSIN (UROXATRAL) 10 MG TB24    Take 10 mg by mouth.    ATORVASTATIN (LIPITOR) 20 MG TABLET        DILTIAZEM (CARDIZEM CD) 240 MG 24 HR CAPSULE    TAKE 1 CAPSULE BY MOUTH EVERY DAY    FINASTERIDE (PROSCAR) 5 MG TABLET    TAKE 1 TABLET BY MOUTH EVERY DAY AT NOON    HYDRALAZINE (APRESOLINE) 50 MG TABLET    Take 1 tablet (50 mg total) by mouth every 12 (twelve) hours.    HYDROCHLOROTHIAZIDE (HYDRODIURIL) 25 MG TABLET    Take 1 tablet (25 mg total) by mouth once daily.    HYDROCODONE-ACETAMINOPHEN (NORCO) 7.5-325 MG PER TABLET    Take 1 tablet by mouth every 6 (six) hours as needed for Pain.    IBUPROFEN (ADVIL,MOTRIN) 800 MG TABLET    TK 1 T PO BID    LOSARTAN (COZAAR) 100 MG TABLET    TK 1 T PO QD    OXYBUTYNIN (DITROPAN-XL) 10 MG 24 HR TABLET    TAKE 1 TABLET BY MOUTH EVERY DAY    TIZANIDINE (ZANAFLEX) 4 MG TABLET    Take 4 mg by mouth every 8 (eight) hours as needed.    ZOLPIDEM (AMBIEN) 10 MG TAB    TAKE 1 TABLET BY MOUTH NIGHTLY AS NEEDED   Discontinued Medications    FLUTICASONE PROPIONATE (FLONASE) 50 MCG/ACTUATION NASAL SPRAY        GABAPENTIN (NEURONTIN) 300 MG CAPSULE    Take 1 capsule (300 mg total) by mouth 3 (three) times daily.        Past Social History:   Social History      Socioeconomic History    Marital status:    Tobacco Use    Smoking status: Former   Substance and Sexual Activity    Alcohol use: Never    Drug use: Never       Allergies: Review of patient's allergies indicates:  No Known Allergies     Family History: History reviewed. No pertinent family history.     Review of Systems:  Review of Systems   Constitutional: Negative.    HENT: Negative.     Eyes: Negative.    Respiratory: Negative.     Cardiovascular: Negative.    Gastrointestinal: Negative.    Endocrine: Negative.    Genitourinary:  Positive for flank pain.   Skin: Negative.    Allergic/Immunologic: Negative.    Neurological: Negative.    Hematological: Negative.    Psychiatric/Behavioral: Negative.       Physical Exam:  Physical Exam  Constitutional:       Appearance: He is normal weight.   HENT:      Head: Normocephalic.      Nose: Nose normal.      Mouth/Throat:      Mouth: Mucous membranes are moist.      Pharynx: Oropharynx is clear.   Eyes:      Extraocular Movements: Extraocular movements intact.      Conjunctiva/sclera: Conjunctivae normal.      Pupils: Pupils are equal, round, and reactive to light.   Cardiovascular:      Rate and Rhythm: Normal rate and regular rhythm.      Pulses: Normal pulses.      Heart sounds: Normal heart sounds.   Pulmonary:      Effort: Pulmonary effort is normal.      Breath sounds: Normal breath sounds.   Abdominal:      General: Abdomen is flat. Bowel sounds are normal.      Palpations: Abdomen is soft.      Hernia: There is no hernia in the right inguinal area or left inguinal area.   Genitourinary:     Penis: Normal. No phimosis, paraphimosis, hypospadias, erythema, tenderness or discharge.       Testes: Normal.         Right: Mass, tenderness or swelling not present. Right testis is descended. Cremasteric reflex is present.          Left: Mass, tenderness or swelling not present. Left testis is descended. Cremasteric reflex is present.       Prostate: Normal.       Rectum: Normal.   Musculoskeletal:         General: Normal range of motion.      Cervical back: Normal range of motion and neck supple.   Lymphadenopathy:      Lower Body: No right inguinal adenopathy. No left inguinal adenopathy.   Skin:     General: Skin is warm and dry.      Capillary Refill: Capillary refill takes less than 2 seconds.   Neurological:      General: No focal deficit present.      Mental Status: He is alert and oriented to person, place, and time. Mental status is at baseline.   Psychiatric:         Mood and Affect: Mood normal.         Behavior: Behavior normal.         Thought Content: Thought content normal.         Judgment: Judgment normal.       Assessment/Plan:     Nephrolithiasis:  14 mm cluster of small nonobstructing calculi in the left kidney the middle 1/3.  11 millimeter cluster of small nonobstructing calculi in the left lower pole.  There is a stent in place at this time.  We will set the patient up for ureteroscopy at the next available date.  Most recent imaging was done at [facility] on 6/19/2023    I, Dr. Russell Zhao have seen and personally evaluated the patient. I have formulated the plan reviewed all pertinent imaging and clinical data.  I agree with the nurse practitioner's assessment, and I have personally formulated the plan for this patient's care as described by the midlevel.  Problem List Items Addressed This Visit    None  Visit Diagnoses       Uric acid nephrolithiasis

## 2023-08-03 LAB
ANION GAP SERPL CALC-SCNC: 8 MMOL/L (ref 3–11)
APPEARANCE, UA: CLEAR
BACTERIA SPEC CULT: ABNORMAL /HPF
BASOPHILS NFR BLD: 0.5 % (ref 0–3)
BILIRUB UR QL STRIP: NEGATIVE MG/DL
BUN SERPL-MCNC: 47 MG/DL (ref 7–18)
BUN/CREAT SERPL: 20.98 RATIO (ref 7–18)
CALCIUM SERPL-MCNC: 10.3 MG/DL (ref 8.8–10.5)
CHLORIDE SERPL-SCNC: 106 MMOL/L (ref 100–108)
CO2 SERPL-SCNC: 27 MMOL/L (ref 21–32)
COLOR UR: ABNORMAL
CREAT SERPL-MCNC: 2.24 MG/DL (ref 0.7–1.3)
EOSINOPHIL NFR BLD: 1.8 % (ref 1–3)
ERYTHROCYTE [DISTWIDTH] IN BLOOD BY AUTOMATED COUNT: 11.9 % (ref 12.5–18)
GFR ESTIMATION: 29
GLUCOSE (UA): NORMAL MG/DL
GLUCOSE SERPL-MCNC: 110 MG/DL (ref 70–110)
HCT VFR BLD AUTO: 37.4 % (ref 42–52)
HGB BLD-MCNC: 12.4 G/DL (ref 14–18)
HGB UR QL STRIP: 250 /UL
KETONES UR QL STRIP: NEGATIVE MG/DL
LEUKOCYTE ESTERASE UR QL STRIP: 25 /UL
LYMPHOCYTES NFR BLD: 17.9 % (ref 25–40)
MCH RBC QN AUTO: 31.6 PG (ref 27–31.2)
MCHC RBC AUTO-ENTMCNC: 33.2 G/DL (ref 31.8–35.4)
MCV RBC AUTO: 95.4 FL (ref 80–97)
MONOCYTES NFR BLD: 5.3 % (ref 1–15)
MUCUS URINE: ABNORMAL /LPF
NEUTROPHILS # BLD AUTO: 4.45 10*3/UL (ref 1.8–7.7)
NEUTROPHILS NFR BLD: 74.3 % (ref 37–80)
NITRITE UR QL STRIP: NEGATIVE
NUCLEATED RED BLOOD CELLS: 0 %
PH UR STRIP: 5 PH (ref 5–9)
PLATELETS: 214 10*3/UL (ref 142–424)
POTASSIUM SERPL-SCNC: 4.6 MMOL/L (ref 3.6–5.2)
PROT UR QL STRIP: 30 MG/DL
RBC # BLD AUTO: 3.92 10*6/UL (ref 4.7–6.1)
RBC #/AREA URNS HPF: ABNORMAL /HPF (ref 0–2)
SERVICE COMMENT 03: ABNORMAL
SODIUM BLD-SCNC: 141 MMOL/L (ref 135–145)
SP GR UR STRIP: 1.02 (ref 1–1.03)
SPECIMEN COLLECTION METHOD, URINE: ABNORMAL
SQUAMOUS EPITHELIAL, UA: ABNORMAL /LPF
UROBILINOGEN UR STRIP-ACNC: NORMAL MG/DL
WBC # BLD: 6 10*3/UL (ref 4.6–10.2)
WBC #/AREA URNS HPF: ABNORMAL /HPF (ref 0–5)

## 2023-08-07 ENCOUNTER — OUTSIDE PLACE OF SERVICE (OUTPATIENT)
Dept: UROLOGY | Facility: CLINIC | Age: 68
End: 2023-08-07

## 2023-08-07 PROCEDURE — 74420 PR  X-RAY RETROGRADE PYELOGRAM: ICD-10-PCS | Mod: 26,,, | Performed by: UROLOGY

## 2023-08-07 PROCEDURE — 52356 CYSTO/URETERO W/LITHOTRIPSY: CPT | Mod: LT,,, | Performed by: UROLOGY

## 2023-08-07 PROCEDURE — 74420 UROGRAPHY RTRGR +-KUB: CPT | Mod: 26,,, | Performed by: UROLOGY

## 2023-08-07 PROCEDURE — 52356 PR CYSTO/URETERO W/LITHOTRIPSY: ICD-10-PCS | Mod: LT,,, | Performed by: UROLOGY

## 2023-08-07 RX ORDER — CIPROFLOXACIN 500 MG/1
500 TABLET ORAL 2 TIMES DAILY
Qty: 6 TABLET | Refills: 0 | Status: CANCELLED | OUTPATIENT
Start: 2023-08-07 | End: 2023-08-10

## 2023-08-07 RX ORDER — SULFAMETHOXAZOLE AND TRIMETHOPRIM 800; 160 MG/1; MG/1
1 TABLET ORAL 2 TIMES DAILY
Qty: 28 TABLET | Refills: 0 | Status: SHIPPED | OUTPATIENT
Start: 2023-08-07 | End: 2023-08-21

## 2023-08-07 RX ORDER — HYDROCODONE BITARTRATE AND ACETAMINOPHEN 10; 325 MG/1; MG/1
1 TABLET ORAL EVERY 6 HOURS PRN
Qty: 15 TABLET | Refills: 0 | Status: SHIPPED | OUTPATIENT
Start: 2023-08-07

## 2023-08-09 LAB
CALCULI COMPOSITION: NORMAL
CALCULI DESCRIPTION: NORMAL
CALCULI MASS: 191 MG
CALCULI PDF: NORMAL

## 2023-08-14 DIAGNOSIS — N32.81 OVERACTIVE BLADDER: Primary | ICD-10-CM

## 2023-08-14 RX ORDER — FINASTERIDE 5 MG/1
TABLET, FILM COATED ORAL
Qty: 30 TABLET | Refills: 11 | Status: SHIPPED | OUTPATIENT
Start: 2023-08-14

## 2023-08-14 NOTE — TELEPHONE ENCOUNTER
----- Message from Genna Washington sent at 8/14/2023 11:45 AM CDT -----  Contact: Merari  Patient is calling in regards to medications. Reports Dr alejandro stated he would refill a prescription from pts previous provider. Pt is needing a script for finasteride (PROSCAR) 5 mg tablet. Please advise at .[phone].       [facility]   [address], [place] [address]  [phone]

## 2024-05-09 DIAGNOSIS — N32.81 OVERACTIVE BLADDER: ICD-10-CM

## 2024-05-09 RX ORDER — FINASTERIDE 5 MG/1
TABLET, FILM COATED ORAL
Qty: 30 TABLET | Refills: 11 | Status: SHIPPED | OUTPATIENT
Start: 2024-05-09

## 2024-06-21 ENCOUNTER — TELEPHONE (OUTPATIENT)
Dept: UROLOGY | Facility: CLINIC | Age: 69
End: 2024-06-21
Payer: COMMERCIAL

## 2024-06-21 NOTE — TELEPHONE ENCOUNTER
Pt calling with issues urinating. He is heading to the ER and will call us back once discharged.    ----- Message from Mel Jones sent at 6/21/2024 12:31 PM CDT -----  Name of Who is Calling:pt           What is the request in detail:patient states he just had surgery and was advised that it is not the medication. Patients Surgeon advised him to speak with his urologist as he is having complications urinating. Patient states he feels the urge but nothing comes out. Pt requesting a call asap.           Can the clinic reply by [facility]:no           What Number to Call Back if not in [facility]: [phone]

## 2024-07-09 ENCOUNTER — OFFICE VISIT (OUTPATIENT)
Dept: UROLOGY | Facility: CLINIC | Age: 69
End: 2024-07-09
Payer: MEDICARE

## 2024-07-09 VITALS
BODY MASS INDEX: 28.04 KG/M2 | OXYGEN SATURATION: 98 % | DIASTOLIC BLOOD PRESSURE: 68 MMHG | SYSTOLIC BLOOD PRESSURE: 154 MMHG | HEIGHT: 68 IN | HEART RATE: 93 BPM | WEIGHT: 185 LBS

## 2024-07-09 DIAGNOSIS — N39.0 URINARY TRACT INFECTION WITHOUT HEMATURIA, SITE UNSPECIFIED: Primary | ICD-10-CM

## 2024-07-09 LAB
BILIRUBIN, UA POC OHS: NEGATIVE
BLOOD, UA POC OHS: NEGATIVE
CLARITY, UA POC OHS: CLEAR
COLOR, UA POC OHS: YELLOW
GLUCOSE, UA POC OHS: NEGATIVE
KETONES, UA POC OHS: NEGATIVE
LEUKOCYTES, UA POC OHS: NEGATIVE
NITRITE, UA POC OHS: NEGATIVE
PH, UA POC OHS: 5
PROTEIN, UA POC OHS: NEGATIVE
SPECIFIC GRAVITY, UA POC OHS: 1.02
UROBILINOGEN, UA POC OHS: 0.2

## 2024-07-09 PROCEDURE — 81003 URINALYSIS AUTO W/O SCOPE: CPT | Mod: QW,S$GLB,, | Performed by: UROLOGY

## 2024-07-09 PROCEDURE — 99214 OFFICE O/P EST MOD 30 MIN: CPT | Mod: S$GLB,,, | Performed by: UROLOGY

## 2024-07-09 NOTE — PROGRESS NOTES
Subjective:       Patient ID: Marcello Dahl is a 69 y.o. male.    Chief Complaint: No chief complaint on file.      HPI: 69-year-old male patient presenting to the clinic to follow up urine tract infection.  Patient recently had hip replacement and long after began experiencing difficulty urinating.  He presented to [facility] and was treated for urinary tract infection and retention.  At this time he was urinating fine and denies any symptoms of infection         Past Medical History:   Past Medical History:   Diagnosis Date    Enlarged prostate     Hyperlipemia     Hypertension     Hypertriglyceridemia     Insomnia     Vitamin D deficiency        Past Surgical Historical:   Past Surgical History:   Procedure Laterality Date    HERNIA REPAIR      HIP REPLACEMENT ARTHROPLASTY Right     June 2024    SHOULDER SURGERY          Medications:   Medication List with Changes/Refills   Current Medications    ALFUZOSIN (UROXATRAL) 10 MG TB24    Take 10 mg by mouth.    ATORVASTATIN (LIPITOR) 20 MG TABLET        DILTIAZEM (CARDIZEM CD) 240 MG 24 HR CAPSULE    TAKE 1 CAPSULE BY MOUTH EVERY DAY    FINASTERIDE (PROSCAR) 5 MG TABLET    TAKE 1 TABLET BY MOUTH EVERY DAY AT NOON    HYDRALAZINE (APRESOLINE) 50 MG TABLET    Take 1 tablet (50 mg total) by mouth every 12 (twelve) hours.    HYDROCHLOROTHIAZIDE (HYDRODIURIL) 25 MG TABLET    Take 1 tablet (25 mg total) by mouth once daily.    HYDROCODONE-ACETAMINOPHEN (NORCO)  MG PER TABLET    Take 1 tablet by mouth every 6 (six) hours as needed for Pain.    HYDROCODONE-ACETAMINOPHEN (NORCO) 7.5-325 MG PER TABLET    Take 1 tablet by mouth every 6 (six) hours as needed for Pain.    IBUPROFEN (ADVIL,MOTRIN) 800 MG TABLET    TK 1 T PO BID    LOSARTAN (COZAAR) 100 MG TABLET    TK 1 T PO QD    OXYBUTYNIN (DITROPAN-XL) 10 MG 24 HR TABLET    TAKE 1 TABLET BY MOUTH EVERY DAY    TIZANIDINE (ZANAFLEX) 4 MG TABLET    Take 4 mg by mouth every 8 (eight) hours as needed.    ZOLPIDEM  (AMBIEN) 10 MG TAB    TAKE 1 TABLET BY MOUTH NIGHTLY AS NEEDED        Past Social History:   Social History     Socioeconomic History    Marital status:    Tobacco Use    Smoking status: Former   Substance and Sexual Activity    Alcohol use: Never    Drug use: Never       Allergies: Review of patient's allergies indicates:  No Known Allergies     Family History: No family history on file.     Review of Systems:  Review of Systems   Constitutional:  Negative for activity change and appetite change.   HENT:  Negative for congestion and dental problem.    Eyes:  Negative for visual disturbance.   Respiratory:  Negative for chest tightness and shortness of breath.    Cardiovascular:  Negative for chest pain.   Gastrointestinal:  Negative for abdominal distention and abdominal pain.   Genitourinary:  Negative for decreased urine volume, difficulty urinating, dysuria, enuresis, flank pain, frequency, genital sores, hematuria, penile discharge, penile pain, penile swelling, scrotal swelling, testicular pain and urgency.   Musculoskeletal:  Negative for back pain and neck pain.   Skin:  Negative for color change.   Neurological:  Negative for dizziness.   Hematological:  Negative for adenopathy.   Psychiatric/Behavioral:  Negative for agitation, behavioral problems and confusion.        Physical Exam:  Physical Exam  Constitutional:       General: He is not in acute distress.     Appearance: He is well-developed.   HENT:      Head: Normocephalic and atraumatic.      Nose: Nose normal.   Eyes:      General: No scleral icterus.     Conjunctiva/sclera: Conjunctivae normal.      Pupils: Pupils are equal, round, and reactive to light.   Neck:      Thyroid: No thyromegaly.      Trachea: No tracheal deviation.   Cardiovascular:      Rate and Rhythm: Normal rate and regular rhythm.      Heart sounds: Normal heart sounds.   Pulmonary:      Effort: Pulmonary effort is normal. No respiratory distress.      Breath sounds: Normal  breath sounds. No wheezing or rales.   Abdominal:      General: Bowel sounds are normal. There is no distension.      Palpations: Abdomen is soft.      Tenderness: There is no abdominal tenderness. There is no guarding or rebound.   Genitourinary:     Penis: Normal. No tenderness.       Prostate: Normal.   Musculoskeletal:         General: No deformity. Normal range of motion.      Cervical back: Neck supple.   Lymphadenopathy:      Cervical: No cervical adenopathy.   Skin:     General: Skin is warm and dry.      Findings: No erythema or rash.   Neurological:      Mental Status: He is alert and oriented to person, place, and time.      Cranial Nerves: No cranial nerve deficit.   Psychiatric:         Behavior: Behavior normal.         Assessment/Plan:     Hospital follow up for urinary tract infection:  Patient was having difficulty urinating.  PVR today 38 mL.  Urinalysis negative for infection.  Complete urine drop-off if symptoms of infection develop.  If patient's occurrence of infection increase we discussed possible cystoscopy  Problem List Items Addressed This Visit    None  Visit Diagnoses       Urinary tract infection without hematuria, site unspecified    -  Primary    Relevant Orders    POCT Urinalysis(Instrument)    POCT Bladder Scan

## 2024-07-22 ENCOUNTER — TELEPHONE (OUTPATIENT)
Dept: UROLOGY | Facility: CLINIC | Age: 69
End: 2024-07-22
Payer: MEDICARE

## 2024-07-22 NOTE — TELEPHONE ENCOUNTER
Spoke with pt and informed we have sent a year worth of proscar.    ----- Message from Re Washington sent at 7/22/2024  9:12 AM CDT -----  Contact: self  Type:  RX Refill Request    Who Called: Marcello Dahl  Refill or New Rx:refill  RX Name and Strength:finasteride (PROSCAR) 5 mg tablet  How is the patient currently taking it? (ex. 1XDay):1 x day  Is this a 30 day or 90 day RX:30  Preferred Pharmacy with phone number:  [id number] - [place], [place] - [address] AT  &   [address]  [place] [address]  Phone: [phone] Fax: [phone]      Local or Mail Order:local  Ordering Provider:Ramon  Would the patient rather a call back or a response via [facility]? Call back  Best Call Back Number:[phone]  Additional Information: n/a

## 2024-08-19 DIAGNOSIS — N32.81 OVERACTIVE BLADDER: ICD-10-CM

## 2024-08-19 RX ORDER — FINASTERIDE 5 MG/1
TABLET, FILM COATED ORAL
Qty: 30 TABLET | Refills: 11 | Status: SHIPPED | OUTPATIENT
Start: 2024-08-19